# Patient Record
Sex: MALE | Race: WHITE | NOT HISPANIC OR LATINO | Employment: FULL TIME | URBAN - METROPOLITAN AREA
[De-identification: names, ages, dates, MRNs, and addresses within clinical notes are randomized per-mention and may not be internally consistent; named-entity substitution may affect disease eponyms.]

---

## 2018-01-08 ENCOUNTER — EVALUATION (OUTPATIENT)
Dept: PHYSICAL THERAPY | Facility: CLINIC | Age: 78
End: 2018-01-08
Payer: MEDICARE

## 2018-01-08 PROCEDURE — G8991 OTHER PT/OT GOAL STATUS: HCPCS

## 2018-01-08 PROCEDURE — 97110 THERAPEUTIC EXERCISES: CPT

## 2018-01-08 PROCEDURE — 97161 PT EVAL LOW COMPLEX 20 MIN: CPT

## 2018-01-08 PROCEDURE — G8990 OTHER PT/OT CURRENT STATUS: HCPCS

## 2018-01-15 ENCOUNTER — APPOINTMENT (OUTPATIENT)
Dept: PHYSICAL THERAPY | Facility: CLINIC | Age: 78
End: 2018-01-15
Payer: MEDICARE

## 2018-01-15 PROCEDURE — 97110 THERAPEUTIC EXERCISES: CPT

## 2018-01-15 PROCEDURE — 97112 NEUROMUSCULAR REEDUCATION: CPT

## 2018-01-19 ENCOUNTER — APPOINTMENT (OUTPATIENT)
Dept: PHYSICAL THERAPY | Facility: CLINIC | Age: 78
End: 2018-01-19
Payer: MEDICARE

## 2018-01-19 PROCEDURE — 97110 THERAPEUTIC EXERCISES: CPT

## 2018-01-23 ENCOUNTER — APPOINTMENT (OUTPATIENT)
Dept: PHYSICAL THERAPY | Facility: CLINIC | Age: 78
End: 2018-01-23
Payer: MEDICARE

## 2018-01-23 PROCEDURE — 97112 NEUROMUSCULAR REEDUCATION: CPT

## 2018-01-23 PROCEDURE — 97110 THERAPEUTIC EXERCISES: CPT

## 2018-01-25 ENCOUNTER — OFFICE VISIT (OUTPATIENT)
Dept: PHYSICAL THERAPY | Facility: CLINIC | Age: 78
End: 2018-01-25
Payer: MEDICARE

## 2018-01-25 DIAGNOSIS — M79.672 LEFT FOOT PAIN: Primary | ICD-10-CM

## 2018-01-25 PROCEDURE — 97110 THERAPEUTIC EXERCISES: CPT | Performed by: PHYSICAL THERAPIST

## 2018-01-25 PROCEDURE — 97112 NEUROMUSCULAR REEDUCATION: CPT | Performed by: PHYSICAL THERAPIST

## 2018-01-25 NOTE — PROGRESS NOTES
Daily Note     Today's date: 2018  Patient name: Jayne Price  : 1940  MRN: [id number]  Referring provider: Bill Daly MD  Dx:   Encounter Diagnosis   Name Primary?  Left foot pain Yes                  Subjective: no signif changes reported      Objective: See treatment diary below  Precautions: drop foot    Daily Treatment Diary     Manual                                                                                   Exercise Diary              biodex catch 4'            biodex pf/df wt shift 1'x3            SLR: flx, abd, ext 3x10            Clam shell 3x10            bridges 30            ppt 30            Mini squats 30            SLB 5'                                                                                                                                                                            Modalities                                                               Assessment: Tolerated treatment well  Patient could benefit from continued PT      Plan: Continue per plan of care

## 2018-01-26 ENCOUNTER — APPOINTMENT (OUTPATIENT)
Dept: PHYSICAL THERAPY | Facility: CLINIC | Age: 78
End: 2018-01-26
Payer: MEDICARE

## 2018-01-29 ENCOUNTER — OFFICE VISIT (OUTPATIENT)
Dept: PHYSICAL THERAPY | Facility: CLINIC | Age: 78
End: 2018-01-29
Payer: MEDICARE

## 2018-01-29 DIAGNOSIS — M79.672 LEFT FOOT PAIN: Primary | ICD-10-CM

## 2018-01-29 PROCEDURE — 97110 THERAPEUTIC EXERCISES: CPT

## 2018-01-29 NOTE — PROGRESS NOTES
Daily Note     Today's date: 2018  Patient name: Nabeel Agrawal  : 1940  MRN: [id number]  Referring provider: Jodi Pinedo MD  Dx: No diagnosis found  Subjective: Patient reports he felt "fine" after the last visit  No significant changes to report in regards to the left foot  Objective: See treatment diary below  Precautions: drop foot     Daily Treatment Diary   X=performed     Manual                      none                             Exercise Diary                        biodex catch 4'  X                   biodex pf/df wt shift 1'x3  X                   SLR: flx, abd, ext 3x10  X                   Clam shell 3x10  X                   bridges 30  X                   ppt 30  X                   Mini squats 30  15                   SLB 5'  X                    bike    10'                         Modalities                                                    Assessment: Patient complains of right knee pain when performing the mini squats  Plan: Continue per plan of care

## 2018-02-01 ENCOUNTER — OFFICE VISIT (OUTPATIENT)
Dept: PHYSICAL THERAPY | Facility: CLINIC | Age: 78
End: 2018-02-01
Payer: MEDICARE

## 2018-02-01 DIAGNOSIS — M79.672 LEFT FOOT PAIN: Primary | ICD-10-CM

## 2018-02-01 PROCEDURE — 97110 THERAPEUTIC EXERCISES: CPT

## 2018-02-01 NOTE — PROGRESS NOTES
Daily Note     Today's date: 2018  Patient name: Lavell Gallagher  : 1940  MRN: [id number]  Referring provider: Ayala Mcnally MD  Dx: No diagnosis found  Subjective: Patient reports his R knee pain cont  No significant changes to report in regards to the L foot  Objective: See treatment diary below  Precautions: drop foot     Daily Treatment Diary   X=performed     Manual                    none                             Exercise Diary                      biodex catch 4'  X  X                 biodex pf/df wt shift 1'x3  X  X                 SLR: flx, abd, ext 3x10  X  X                 Clam shell 3x10  X  X                 bridges 30  X  X                 ppt 30  X  X                 Mini squats 30  15  30                 SLB 5'  X  X                 Treadmill   1  7mph x 10'           bike    10'  NP                       Modalities                                                    Assessment: Pt requested w/u on TM in leiu of bike  R knee pain when performing the mini squats  Side lying hip ABD challenging on L  Plan: Continue per plan of care

## 2018-02-06 ENCOUNTER — OFFICE VISIT (OUTPATIENT)
Dept: PHYSICAL THERAPY | Facility: CLINIC | Age: 78
End: 2018-02-06
Payer: MEDICARE

## 2018-02-06 DIAGNOSIS — M79.672 LEFT FOOT PAIN: Primary | ICD-10-CM

## 2018-02-06 PROCEDURE — 97110 THERAPEUTIC EXERCISES: CPT | Performed by: PHYSICAL THERAPIST

## 2018-02-06 PROCEDURE — 97112 NEUROMUSCULAR REEDUCATION: CPT | Performed by: PHYSICAL THERAPIST

## 2018-02-06 NOTE — PROGRESS NOTES
Daily Note     Today's date: 2018  Patient name: Sendy Wade  : 1940  MRN: [id number]  Referring provider: Lizeth Ac MD  Dx:   Encounter Diagnosis   Name Primary?  Left foot pain Yes                  Subjective: no signif changes reported  Objective: See treatment diary below  Precautions: drop foot     Daily Treatment Diary   X=performed     Manual                  none                             Exercise Diary                    biodex catch 4'  X  X  x               biodex pf/df wt shift 1'x3  X  X  x               SLR: flx, abd, ext 3x10  X  X  x               Clam shell 3x10  X  X  x               bridges 30  X  X  x               ppt 30  X  X  x               Mini squats 30  15  30  np               SLB 5'  X  X  x               Treadmill   1  7mph x 10' x          bike    10'  NP  np                     Modalities                                                    Assessment: Samantha TM well with HHA using railing  Bike and squats np due to knee pain  Plan: Continue per plan of care

## 2018-02-08 ENCOUNTER — OFFICE VISIT (OUTPATIENT)
Dept: PHYSICAL THERAPY | Facility: CLINIC | Age: 78
End: 2018-02-08
Payer: MEDICARE

## 2018-02-08 DIAGNOSIS — M79.672 LEFT FOOT PAIN: Primary | ICD-10-CM

## 2018-02-08 PROCEDURE — 97112 NEUROMUSCULAR REEDUCATION: CPT | Performed by: PHYSICAL THERAPY ASSISTANT

## 2018-02-08 PROCEDURE — 97110 THERAPEUTIC EXERCISES: CPT | Performed by: PHYSICAL THERAPY ASSISTANT

## 2018-02-08 NOTE — PROGRESS NOTES
Daily Note     Today's date: 2018  Patient name: Maliha Magana  : 1940  MRN: [id number]  Referring provider: Addis Ordonez MD  Dx:   Encounter Diagnosis   Name Primary?  Left foot pain Yes                  Subjective: no signif changes reported  Objective: See treatment diary below  Precautions: drop foot     Daily Treatment Diary   X=performed     Manual                none                             Exercise Diary                  biodex catch 4'  X  X  x  4'             biodex pf/df wt shift 1'x3  X  X  x  1'x3             SLR: flx, abd, ext 3x10  X  X  x  3x10             Clam shell 3x10  X  X  x  3x10             bridges 30  X  X  x  3x10             ppt 30  X  X  x  3x10             Mini squats 30  15  30  np  NP             SLB 5'  X  X  x  5'             Treadmill   1  7mph x 10' x 2 0mph  x10'         bike    10'  NP  np  NP                   Modalities                                                    Assessment: Samantha TM well with HHA using railing  Bike and squats np due to knee pain  Good tolerance to TE as noted  Pt demonstrates good compliance and understanding of exercise program      Plan: Continue per plan of care

## 2018-02-13 ENCOUNTER — APPOINTMENT (OUTPATIENT)
Dept: PHYSICAL THERAPY | Facility: CLINIC | Age: 78
End: 2018-02-13
Payer: MEDICARE

## 2018-02-15 ENCOUNTER — OFFICE VISIT (OUTPATIENT)
Dept: PHYSICAL THERAPY | Facility: CLINIC | Age: 78
End: 2018-02-15
Payer: MEDICARE

## 2018-02-15 DIAGNOSIS — M79.672 LEFT FOOT PAIN: Primary | ICD-10-CM

## 2018-02-15 PROCEDURE — 97112 NEUROMUSCULAR REEDUCATION: CPT

## 2018-02-15 PROCEDURE — 97110 THERAPEUTIC EXERCISES: CPT

## 2018-02-15 NOTE — PROGRESS NOTES
Daily Note     Today's date: 2/15/2018  Patient name: Grant Fuentes  : 1940  MRN: [id number]  Referring provider: Justine Lamas MD  Dx:   No diagnosis found  Subjective: No significant changes to report since the last visit  Objective: See treatment diary below  Precautions: drop foot     Daily Treatment Diary   X=performed     Manual  1/25  1/29  2/1  2/6  2/8  2/15            none                             Exercise Diary     1/29  2/1  2/6  2/8  2/15           biodex catch 4'  X  X  x  4'  L 11   4'           biodex pf/df wt shift 1'x3  X  X  x  1'x3  L 10  90"x2           SLR: flx, abd, ext 3x10  X  X  x  3x10  x           Clam shell 3x10  X  X  x  3x10  x           bridges 30  X  X  x  3x10  x           ppt 30  X  X  x  3x10  x           Mini squats 30  15  30  np  NP  NP           SLB 5'  X  X  x  5'  30"x3           Treadmill   1  7mph x 10' x 2 0mph  x10' x        bike    10'  NP  np  NP  NP           Tandem ambulation on foam balance beams      8 laps             Modalities                         none                         Assessment: Patient's balance is challenged with additional tandem ambulation this visit  Plan: Continue per plan of care

## 2018-02-20 ENCOUNTER — OFFICE VISIT (OUTPATIENT)
Dept: PHYSICAL THERAPY | Facility: CLINIC | Age: 78
End: 2018-02-20
Payer: MEDICARE

## 2018-02-20 DIAGNOSIS — M79.672 LEFT FOOT PAIN: Primary | ICD-10-CM

## 2018-02-20 PROCEDURE — 97110 THERAPEUTIC EXERCISES: CPT | Performed by: PHYSICAL THERAPIST

## 2018-02-20 PROCEDURE — G8990 OTHER PT/OT CURRENT STATUS: HCPCS | Performed by: PHYSICAL THERAPIST

## 2018-02-20 PROCEDURE — G8991 OTHER PT/OT GOAL STATUS: HCPCS | Performed by: PHYSICAL THERAPIST

## 2018-02-20 PROCEDURE — 97112 NEUROMUSCULAR REEDUCATION: CPT | Performed by: PHYSICAL THERAPIST

## 2018-02-20 NOTE — PROGRESS NOTES
Daily Note     Today's date: 2018  Patient name: Carlos Louis  : 1940  MRN: [id number]  Referring provider: All Ahn MD  Dx:   Encounter Diagnosis   Name Primary?  Left foot pain Yes                  Subjective: No significant changes to report since the last visit  Objective: See treatment diary below  Precautions: drop foot     Daily Treatment Diary   X=performed     Manual  1/25  1/29  2/1  2/6  2/8  2/15  2/20          none                             Exercise Diary     1/29  2/1  2/6  2/8  2/15  2/20         biodex catch 4'  X  X  x  4'  L 11   4'  4'         biodex pf/df wt shift 1'x3  X  X  x  1'x3  L 10  90"x2  np         SLR: flx, abd, ext 3x10  X  X  x  3x10  x  x         Clam shell 3x10  X  X  x  3x10  x  x         bridges 30  X  X  x  3x10  x  x         ppt 30  X  X  x  3x10  x  x         Mini squats 30  15  30  np  NP  NP           SLB 5'  X  X  x  5'  30"x3  x         Treadmill   1  7mph x 10' x 2 0mph  x10' x x       bike    10'  NP  np  NP  NP  np         Tandem ambulation on foam balance beams      8 laps np            Modalities                         none                         Assessment: pt edilson rx well    Plan: anticipate DC to [facility] next week

## 2018-02-23 ENCOUNTER — OFFICE VISIT (OUTPATIENT)
Dept: PHYSICAL THERAPY | Facility: CLINIC | Age: 78
End: 2018-02-23
Payer: MEDICARE

## 2018-02-23 DIAGNOSIS — M79.672 LEFT FOOT PAIN: Primary | ICD-10-CM

## 2018-02-23 PROCEDURE — 97014 ELECTRIC STIMULATION THERAPY: CPT | Performed by: PHYSICAL THERAPIST

## 2018-02-23 PROCEDURE — 97112 NEUROMUSCULAR REEDUCATION: CPT | Performed by: PHYSICAL THERAPIST

## 2018-02-23 PROCEDURE — 97110 THERAPEUTIC EXERCISES: CPT | Performed by: PHYSICAL THERAPIST

## 2018-02-23 NOTE — PROGRESS NOTES
Daily Note     Today's date: 2018  Patient name: Miguelito Osborne  : 1940  MRN: [id number]  Referring provider: Rosalva Turner MD  Dx:   Encounter Diagnosis   Name Primary?  Left foot pain Yes                  Subjective: No significant changes to report since the last visit  Objective: See treatment diary below  Precautions: drop foot     Daily Treatment Diary   X=performed     Manual  1/25  1/29  2/1  2/6  2/8  2/15  2/20  2/23        none                             Exercise Diary     1/29  2/1  2/6  2/8  2/15  2/20         biodex catch 4'  X  X  x  4'  L 11   4'  4'  4'       biodex pf/df wt shift 1'x3  X  X  x  1'x3  L 10  90"x2  np  np       SLR: flx, abd, ext 3x10  X  X  x  3x10  x  x  x       Clam shell 3x10  X  X  x  3x10  x  x  x       bridges 30  X  X  x  3x10  x  x  x       ppt 30  X  X  x  3x10  x  x  x       Mini squats 30  15  30  np  NP  NP           SLB 5'  X  X  x  5'  30"x3  x         Treadmill   1  7mph x 10' x 2 0mph  x10' x x x      bike    10'  NP  np  NP  NP  np  np       Tandem ambulation on foam balance beams      8 laps np np           Modalities                         none                       Trial of Russian stim to activate ant tib, no muscle activity noted  Assessment: pt edilson rx well    Plan: anticipate DC to [facility] next week

## 2018-02-28 ENCOUNTER — OFFICE VISIT (OUTPATIENT)
Dept: PHYSICAL THERAPY | Facility: CLINIC | Age: 78
End: 2018-02-28
Payer: MEDICARE

## 2018-02-28 DIAGNOSIS — M79.672 LEFT FOOT PAIN: Primary | ICD-10-CM

## 2018-02-28 PROCEDURE — 97110 THERAPEUTIC EXERCISES: CPT | Performed by: PHYSICAL THERAPIST

## 2018-02-28 NOTE — PROGRESS NOTES
Daily Note     Today's date: 2018  Patient name: Cyndi Arce  : 1940  MRN: [id number]  Referring provider: Samantha Chin MD  Dx:   Encounter Diagnosis   Name Primary?  Left foot pain Yes                  Subjective: No significant changes to report since the last visit  Objective: See treatment diary below  Precautions: drop foot     Daily Treatment Diary   X=performed     Exercise Diary     1/29  2/1  2/6  2/8  2/15  2/20    2/27     biodex catch 4'  X  X  x  4'  L 11   4'  4'  4'  '6     biodex pf/df wt shift 1'x3  X  X  x  1'x3  L 10  90"x2  np  np       SLR: flx, abd, ext 3x10  X  X  x  3x10  x  x  x  x     Clam shell 3x10  X  X  x  3x10  x  x  x  x     bridges 30  X  X  x  3x10  x  x  x  x     ppt 30  X  X  x  3x10  x  x  x  x     Mini squats 30  15  30  np  NP  NP           SLB 5'  X  X  x  5'  30"x3  x    x     Treadmill     1  7mph x 10' x 2 0mph  x10' x x x  np      bike    10'  NP  np  NP  NP  np  np       Tandem ambulation on foam balance beams            x               Assessment: pt edilson rx well    Plan: anticipate DC to [facility] next week

## 2018-10-24 ENCOUNTER — TRANSCRIBE ORDERS (OUTPATIENT)
Dept: NEUROSURGERY | Facility: CLINIC | Age: 78
End: 2018-10-24

## 2018-10-24 DIAGNOSIS — M54.50 LOWER BACK PAIN: Primary | ICD-10-CM

## 2018-10-26 ENCOUNTER — TRANSCRIBE ORDERS (OUTPATIENT)
Dept: ADMINISTRATIVE | Facility: HOSPITAL | Age: 78
End: 2018-10-26

## 2018-10-26 ENCOUNTER — APPOINTMENT (OUTPATIENT)
Dept: LAB | Facility: CLINIC | Age: 78
End: 2018-10-26
Payer: MEDICARE

## 2018-10-26 DIAGNOSIS — E78.00 PURE HYPERCHOLESTEROLEMIA: ICD-10-CM

## 2018-10-26 DIAGNOSIS — D51.0 PERNICIOUS ANEMIA: ICD-10-CM

## 2018-10-26 DIAGNOSIS — E55.9 AVITAMINOSIS D: ICD-10-CM

## 2018-10-26 DIAGNOSIS — I51.9 MYXEDEMA HEART DISEASE: ICD-10-CM

## 2018-10-26 DIAGNOSIS — E13.9 DIABETES MELLITUS OF OTHER TYPE WITHOUT COMPLICATION, UNSPECIFIED WHETHER LONG TERM INSULIN USE (HCC): ICD-10-CM

## 2018-10-26 DIAGNOSIS — E13.9 DIABETES MELLITUS OF OTHER TYPE WITHOUT COMPLICATION, UNSPECIFIED WHETHER LONG TERM INSULIN USE (HCC): Primary | ICD-10-CM

## 2018-10-26 DIAGNOSIS — E03.9 MYXEDEMA HEART DISEASE: ICD-10-CM

## 2018-10-26 DIAGNOSIS — D64.9 ANEMIA, UNSPECIFIED TYPE: ICD-10-CM

## 2018-10-26 DIAGNOSIS — R53.83 FATIGUE, UNSPECIFIED TYPE: ICD-10-CM

## 2018-10-26 DIAGNOSIS — N40.0 BENIGN PROSTATIC HYPERPLASIA, UNSPECIFIED WHETHER LOWER URINARY TRACT SYMPTOMS PRESENT: ICD-10-CM

## 2018-10-26 LAB
25(OH)D3 SERPL-MCNC: 17.6 NG/ML (ref 30–100)
ALBUMIN SERPL BCP-MCNC: 3.9 G/DL (ref 3.5–5)
ALP SERPL-CCNC: 88 U/L (ref 46–116)
ALT SERPL W P-5'-P-CCNC: 44 U/L (ref 12–78)
ANION GAP SERPL CALCULATED.3IONS-SCNC: 5 MMOL/L (ref 4–13)
AST SERPL W P-5'-P-CCNC: 29 U/L (ref 5–45)
BASOPHILS # BLD AUTO: 0.03 THOUSANDS/ΜL (ref 0–0.1)
BASOPHILS NFR BLD AUTO: 0 % (ref 0–1)
BILIRUB SERPL-MCNC: 0.95 MG/DL (ref 0.2–1)
BUN SERPL-MCNC: 21 MG/DL (ref 5–25)
CALCIUM SERPL-MCNC: 8.9 MG/DL (ref 8.3–10.1)
CHLORIDE SERPL-SCNC: 104 MMOL/L (ref 100–108)
CHOLEST SERPL-MCNC: 158 MG/DL (ref 50–200)
CO2 SERPL-SCNC: 29 MMOL/L (ref 21–32)
CREAT SERPL-MCNC: 0.72 MG/DL (ref 0.6–1.3)
CREAT UR-MCNC: 153 MG/DL
EOSINOPHIL # BLD AUTO: 0.35 THOUSAND/ΜL (ref 0–0.61)
EOSINOPHIL NFR BLD AUTO: 5 % (ref 0–6)
ERYTHROCYTE [DISTWIDTH] IN BLOOD BY AUTOMATED COUNT: 12.8 % (ref 11.6–15.1)
GFR SERPL CREATININE-BSD FRML MDRD: 89 ML/MIN/1.73SQ M
GLUCOSE P FAST SERPL-MCNC: 115 MG/DL (ref 65–99)
HCT VFR BLD AUTO: 46.8 % (ref 36.5–49.3)
HDLC SERPL-MCNC: 47 MG/DL (ref 40–60)
HGB BLD-MCNC: 16.1 G/DL (ref 12–17)
IMM GRANULOCYTES # BLD AUTO: 0.01 THOUSAND/UL (ref 0–0.2)
IMM GRANULOCYTES NFR BLD AUTO: 0 % (ref 0–2)
LDLC SERPL CALC-MCNC: 93 MG/DL (ref 0–100)
LYMPHOCYTES # BLD AUTO: 2.06 THOUSANDS/ΜL (ref 0.6–4.47)
LYMPHOCYTES NFR BLD AUTO: 29 % (ref 14–44)
MCH RBC QN AUTO: 33.2 PG (ref 26.8–34.3)
MCHC RBC AUTO-ENTMCNC: 34.4 G/DL (ref 31.4–37.4)
MCV RBC AUTO: 97 FL (ref 82–98)
MICROALBUMIN UR-MCNC: 47.9 MG/L (ref 0–20)
MICROALBUMIN/CREAT 24H UR: 31 MG/G CREATININE (ref 0–30)
MONOCYTES # BLD AUTO: 0.5 THOUSAND/ΜL (ref 0.17–1.22)
MONOCYTES NFR BLD AUTO: 7 % (ref 4–12)
NEUTROPHILS # BLD AUTO: 4.27 THOUSANDS/ΜL (ref 1.85–7.62)
NEUTS SEG NFR BLD AUTO: 59 % (ref 43–75)
NONHDLC SERPL-MCNC: 111 MG/DL
NRBC BLD AUTO-RTO: 0 /100 WBCS
PLATELET # BLD AUTO: 224 THOUSANDS/UL (ref 149–390)
PMV BLD AUTO: 10.4 FL (ref 8.9–12.7)
POTASSIUM SERPL-SCNC: 3.8 MMOL/L (ref 3.5–5.3)
PROT SERPL-MCNC: 7.4 G/DL (ref 6.4–8.2)
PSA SERPL-MCNC: 0.5 NG/ML (ref 0–4)
RBC # BLD AUTO: 4.85 MILLION/UL (ref 3.88–5.62)
SODIUM SERPL-SCNC: 138 MMOL/L (ref 136–145)
TRIGL SERPL-MCNC: 88 MG/DL
TSH SERPL DL<=0.05 MIU/L-ACNC: 0.98 UIU/ML (ref 0.36–3.74)
VIT B12 SERPL-MCNC: 583 PG/ML (ref 100–900)
WBC # BLD AUTO: 7.22 THOUSAND/UL (ref 4.31–10.16)

## 2018-10-26 PROCEDURE — 85025 COMPLETE CBC W/AUTO DIFF WBC: CPT

## 2018-10-26 PROCEDURE — 84443 ASSAY THYROID STIM HORMONE: CPT

## 2018-10-26 PROCEDURE — 82043 UR ALBUMIN QUANTITATIVE: CPT | Performed by: FAMILY MEDICINE

## 2018-10-26 PROCEDURE — 80061 LIPID PANEL: CPT

## 2018-10-26 PROCEDURE — 36415 COLL VENOUS BLD VENIPUNCTURE: CPT

## 2018-10-26 PROCEDURE — G0103 PSA SCREENING: HCPCS

## 2018-10-26 PROCEDURE — 82306 VITAMIN D 25 HYDROXY: CPT

## 2018-10-26 PROCEDURE — 80053 COMPREHEN METABOLIC PANEL: CPT

## 2018-10-26 PROCEDURE — 82570 ASSAY OF URINE CREATININE: CPT | Performed by: FAMILY MEDICINE

## 2018-10-26 PROCEDURE — 82607 VITAMIN B-12: CPT

## 2018-10-29 ENCOUNTER — HOSPITAL ENCOUNTER (OUTPATIENT)
Dept: RADIOLOGY | Facility: HOSPITAL | Age: 78
Discharge: HOME | End: 2018-10-29
Attending: NEUROLOGICAL SURGERY
Payer: MEDICARE

## 2018-10-29 ENCOUNTER — OFFICE VISIT (OUTPATIENT)
Dept: NEUROSURGERY | Facility: CLINIC | Age: 78
End: 2018-10-29
Payer: MEDICARE

## 2018-10-29 VITALS
SYSTOLIC BLOOD PRESSURE: 146 MMHG | RESPIRATION RATE: 18 BRPM | TEMPERATURE: 97.9 F | WEIGHT: 204 LBS | DIASTOLIC BLOOD PRESSURE: 70 MMHG | HEIGHT: 69 IN | HEART RATE: 92 BPM | BODY MASS INDEX: 30.21 KG/M2

## 2018-10-29 DIAGNOSIS — M48.062 LUMBAR STENOSIS WITH NEUROGENIC CLAUDICATION: ICD-10-CM

## 2018-10-29 DIAGNOSIS — M54.50 LOWER BACK PAIN: ICD-10-CM

## 2018-10-29 DIAGNOSIS — M21.372 FOOT DROP, BILATERAL: ICD-10-CM

## 2018-10-29 DIAGNOSIS — M43.16 SPONDYLOLISTHESIS OF LUMBAR REGION: ICD-10-CM

## 2018-10-29 DIAGNOSIS — M21.371 FOOT DROP, BILATERAL: ICD-10-CM

## 2018-10-29 DIAGNOSIS — M43.16 SPONDYLOLISTHESIS OF LUMBAR REGION: Primary | ICD-10-CM

## 2018-10-29 PROCEDURE — 72120 X-RAY BEND ONLY L-S SPINE: CPT

## 2018-10-29 PROCEDURE — 99205 OFFICE O/P NEW HI 60 MIN: CPT | Performed by: NEUROLOGICAL SURGERY

## 2018-10-29 RX ORDER — DIPHENOXYLATE HYDROCHLORIDE AND ATROPINE SULFATE 2.5; .025 MG/1; MG/1
1 TABLET ORAL DAILY
COMMUNITY
End: 2018-12-12 | Stop reason: HOSPADM

## 2018-10-29 RX ORDER — ACETAMINOPHEN 500 MG
1000 TABLET ORAL EVERY 6 HOURS PRN
COMMUNITY
Start: 2017-12-19

## 2018-10-29 RX ORDER — CYANOCOBALAMIN (VITAMIN B-12) 5000 MCG
TABLET,DISINTEGRATING ORAL DAILY
COMMUNITY
Start: 2017-12-19

## 2018-10-29 NOTE — LETTER
October 29, 2018     Jimmyt[place] Kayla, [address]     Patient: Don Burroughs   YOB: 1940   Date of Visit: 10/29/2018       Dear Dr Lorenzo Tate: Thank you for referring Russellhollie Jarrell to me for evaluation  Below are my notes for this consultation  If you have questions, please do not hesitate to call me  I look forward to following your patient along with you  Sincerely,        Jessica Michelle MD        CC: No Recipients  Jessica Michelle MD  10/29/2018  3:27 PM  Sign at close encounter  Office Note - Neurosurgery   oDn Burroughs 77 y o  male MRN: [id number]      Assessment:    Patient is gradually worsening  Symptoms, as detailed in HPI, continue to significantly impact of patient's quality of life in daily activities  After carefully considering presentation, investigations, functional status and co-morbidities, the risk/benefit profile of surgical intervention is favorable  50-year-old gentleman with progressive lumbar neurogenic claudication secondary to L4-5 stenosis with spondylolisthesis  His symptoms continue to progress  I explained that the natural history is for progressive decline in gait  Over time this can result in functional paraplegia and incontinence  He does not have much pain and I do not think epidural steroid injection or pain medication will significantly improve his symptoms  We discussed L4-5 posterior decompression with instrumented fixation fusion and possible transverse lumbar interbody fusion with possible extension to additional levels  He has significant bilateral footdrop which will not improve given the severity and duration of the symptoms  I asked him to discuss fitting for an AFO bilaterally with his PCP to improve his gait stability and lower his risk of falls  The goal of surgery is to relieve pressure neural structures and hopefully improve radicular pain and symptoms of neurogenic claudication  Weakness, numbness and back pain are less likely to improve  The risks of surgery were described in detail  1  Risk of general anesthetic with possible cardiac and respiratory complication  Risk of infection and bleeding  2  Risk of neurological injury with new pain, weakness or numbness in the legs or difficulties with bowel and bladder function  Risk of CSF leak was described  3  Possible need for additional lumbar spine surgery in the future  Expected postoperative course, including activity restrictions, expected pain and postoperative medication were reviewed  Patient provided verbal consent to surgical procedure and signed consent form: Yes  He will require medical clearance  He will return to review results of an up-to-date MRI and plain films lumbar spine prior to surgery  He will contact my office should any concerns arise in the interim  History, physical examination and diagnostic tests were reviewed and questions answered  Diagnosis, care plan and treatment options were discussed  The patient and spouse/SO understand instructions and will follow up as directed  Plan:    Follow-up:   After tests complete      Problem List Items Addressed This Visit        Musculoskeletal and Integument    Spondylolisthesis of lumbar region - Primary    Relevant Orders    X-ray lumbar spine flexion and extension only 4+ views    MRI lumbar spine wo contrast    Case request operating room: L4-5 posterior decompression with instrumented fixation and fusion and possible transverse lumbar interbody graft with possible extension to additional levels (Completed)    Ambulatory referral to Family Practice       Other    Lumbar stenosis with neurogenic claudication    Relevant Orders    X-ray lumbar spine flexion and extension only 4+ views    MRI lumbar spine wo contrast    Case request operating room: L4-5 posterior decompression with instrumented fixation and fusion and possible transverse lumbar interbody graft with possible extension to additional levels (Completed)    Ambulatory referral to [facility]    Foot drop, bilateral    Relevant Orders    Case request operating room: L4-5 posterior decompression with instrumented fixation and fusion and possible transverse lumbar interbody graft with possible extension to additional levels (Completed)    Ambulatory referral to [facility]      Other Visit Diagnoses     Lower back pain              Subjective/Objective     Chief Complaint      Progressive gait difficulties  HPI      34-year-old gentleman with a 2 year history of progressive decline in gait and walking  He developed a left foot drop 2 years ago which was 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  More recently in the last half year so he has began to notice week miss in the right foot as well  He denies any significant lower back pain which she rates as 2/10  He denies any pain in his legs  When he stands and walks he feels clumsiness in his legs and numbness in his feet which seems to progress  He can ambulate for approximately 2 blocks before he must sit down or lean forward and his symptoms improve and he can walk again  He denies any difficulties with bowel bladder function or changing perineal sensation  He has noticed that he tripped over steps  Physical therapy was initially helpful  He occasionally takes Tylenol  He has not had epidural steroid injection  He presents today with an out of date MRI from 2017  ROS    Review of Systems   Constitutional: Negative for fatigue  HENT: Negative  Eyes: Negative  Respiratory: Negative  Cardiovascular: Negative  Gastrointestinal: Positive for constipation  Endocrine: Negative  Genitourinary: Positive for decreased urine volume  Negative for frequency and urgency  Musculoskeletal: Positive for gait problem (off balance, recent falls due to drop foot )   Negative for back pain (across lower back, more of achey pain then sharp pain )  Skin: Negative  Allergic/Immunologic: Negative  Neurological: Positive for weakness (bilateral legs ) and numbness (bottom of bilateral feet numbness, and left foot tingling)  Negative for dizziness, seizures, syncope and headaches  Hematological: Negative  Psychiatric/Behavioral: Positive for sleep disturbance (due to knee discomfort and pain )  Negative for confusion  Family History    History reviewed  No pertinent family history  Social History    Social History     Social History    Marital status: Single     Spouse name: N/A    Number of children: N/A    Years of education: N/A     Occupational History    Not on file       Social History Main Topics    Smoking status: Former Smoker    Smokeless tobacco: Never Used    Alcohol use Yes    Drug use: No    Sexual activity: Not on file     Other Topics Concern    Not on file     Social History Narrative    No narrative on file       Past Medical History    Past Medical History:   Diagnosis Date    History of chicken pox     Kidney stones     Measles        Surgical History    Past Surgical History:   Procedure Laterality Date    KIDNEY STONE SURGERY         Medications      Current Outpatient Prescriptions:     acetaminophen (TYLENOL) 500 mg tablet, Take 1,000 mg by mouth every 6 (six) hours as needed, Disp: , Rfl:     Cyanocobalamin (VITAMIN B-12) 5000 MCG TBDP, Take by mouth daily, Disp: , Rfl:     multivitamin (THERAGRAN) TABS, Take 1 tablet by mouth daily, Disp: , Rfl:     Allergies    Allergies   Allergen Reactions    Iodine Rash       The following portions of the patient's history were reviewed and updated as appropriate: allergies, current medications, past family history, past medical history, past social history, past surgical history and problem list     Investigations    I personally reviewed the MRI results with the patient:     MRI of the thoracic spine without contrast dated August 29th, 2017  Grade 1-2 degenerative anterolisthesis at L4-5 producing moderate central and severe bilateral relieve lateral recess and foraminal stenosis  Milder degenerative changes throughout the lumbar spine which are not clearly compressive  Intrathecal contents unremarkable  Physical Exam    Vitals:  Blood pressure 146/70, pulse 92, temperature 97 9 °F (36 6 °C), resp  rate 18, height 5' 9" (1 753 m), weight 92 5 kg (204 lb)  ,Body mass index is 30 13 kg/m²  Physical Exam   Constitutional: He is oriented to person, place, and time  He appears well-developed and well-nourished  No distress  Cardiovascular: Normal rate, regular rhythm and normal heart sounds  Pulmonary/Chest: Effort normal and breath sounds normal    Musculoskeletal: Normal range of motion  Neurological: He is alert and oriented to person, place, and time  He has normal strength  Reflex Scores:       Patellar reflexes are 0 on the right side and 0 on the left side  Achilles reflexes are 0 on the right side  Skin: Skin is warm and dry  Psychiatric: He has a normal mood and affect  His behavior is normal    Vitals reviewed  Neurologic Exam     Mental Status   Oriented to person, place, and time  Motor Exam     Strength   Strength 5/5 throughout    0/5 power on dorsiflexion  And EHL bilaterally  Sensory Exam   Right leg light touch: normal  Left leg light touch: normal   Diminished pinprick sensation on medial malleoli bilaterally  Gait, Coordination, and Reflexes     Reflexes   Right patellar: 0  Left patellar: 0  Right achilles: 0  Left : 0  Right ankle clonus: absent  Left ankle clonus: absent  Walks with a mildly wide-based steppage gait to accommodate for bilateral footdrop

## 2018-10-29 NOTE — PROGRESS NOTES
Office Note - Neurosurgery   Yung Gamez 77 y o  male MRN: [id number]      Assessment:    Patient is gradually worsening  Symptoms, as detailed in HPI, continue to significantly impact of patient's quality of life in daily activities  After carefully considering presentation, investigations, functional status and co-morbidities, the risk/benefit profile of surgical intervention is favorable  34-year-old gentleman with progressive lumbar neurogenic claudication secondary to L4-5 stenosis with spondylolisthesis  His symptoms continue to progress  I explained that the natural history is for progressive decline in gait  Over time this can result in functional paraplegia and incontinence  He does not have much pain and I do not think epidural steroid injection or pain medication will significantly improve his symptoms  We discussed L4-5 posterior decompression with instrumented fixation fusion and possible transverse lumbar interbody fusion with possible extension to additional levels  He has significant bilateral footdrop which will not improve given the severity and duration of the symptoms  I asked him to discuss fitting for an AFO bilaterally with his PCP to improve his gait stability and lower his risk of falls  The goal of surgery is to relieve pressure neural structures and hopefully improve radicular pain and symptoms of neurogenic claudication  Weakness, numbness and back pain are less likely to improve  The risks of surgery were described in detail  1  Risk of general anesthetic with possible cardiac and respiratory complication  Risk of infection and bleeding  2  Risk of neurological injury with new pain, weakness or numbness in the legs or difficulties with bowel and bladder function  Risk of CSF leak was described  3  Possible need for additional lumbar spine surgery in the future             Expected postoperative course, including activity restrictions, expected pain and postoperative medication were reviewed  Patient provided verbal consent to surgical procedure and signed consent form: Yes  He will require medical clearance  He will return to review results of an up-to-date MRI and plain films lumbar spine prior to surgery  He will contact my office should any concerns arise in the interim  History, physical examination and diagnostic tests were reviewed and questions answered  Diagnosis, care plan and treatment options were discussed  The patient and spouse/SO understand instructions and will follow up as directed  Plan:    Follow-up:   After tests complete  Problem List Items Addressed This Visit        Musculoskeletal and Integument    Spondylolisthesis of lumbar region - Primary    Relevant Orders    X-ray lumbar spine flexion and extension only 4+ views    MRI lumbar spine wo contrast    Case request operating room: L4-5 posterior decompression with instrumented fixation and fusion and possible transverse lumbar interbody graft with possible extension to additional levels (Completed)    Ambulatory referral to Family Practice       Other    Lumbar stenosis with neurogenic claudication    Relevant Orders    X-ray lumbar spine flexion and extension only 4+ views    MRI lumbar spine wo contrast    Case request operating room: L4-5 posterior decompression with instrumented fixation and fusion and possible transverse lumbar interbody graft with possible extension to additional levels (Completed)    Ambulatory referral to [facility], bilateral    Relevant Orders    Case request operating room: L4-5 posterior decompression with instrumented fixation and fusion and possible transverse lumbar interbody graft with possible extension to additional levels (Completed)    Ambulatory referral to [facility]      Other Visit Diagnoses     Lower back pain              Subjective/Objective     Chief Complaint      Progressive gait difficulties      HPI 19-year-old gentleman with a 2 year history of progressive decline in gait and walking  He developed a left foot drop 2 years ago which was 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  More recently in the last half year so he has began to notice week miss in the right foot as well  He denies any significant lower back pain which she rates as 2/10  He denies any pain in his legs  When he stands and walks he feels clumsiness in his legs and numbness in his feet which seems to progress  He can ambulate for approximately 2 blocks before he must sit down or lean forward and his symptoms improve and he can walk again  He denies any difficulties with bowel bladder function or changing perineal sensation  He has noticed that he tripped over steps  Physical therapy was initially helpful  He occasionally takes Tylenol  He has not had epidural steroid injection  He presents today with an out of date MRI from 2017  ROS    Review of Systems   Constitutional: Negative for fatigue  HENT: Negative  Eyes: Negative  Respiratory: Negative  Cardiovascular: Negative  Gastrointestinal: Positive for constipation  Endocrine: Negative  Genitourinary: Positive for decreased urine volume  Negative for frequency and urgency  Musculoskeletal: Positive for gait problem (off balance, recent falls due to drop foot )  Negative for back pain (across lower back, more of achey pain then sharp pain )  Skin: Negative  Allergic/Immunologic: Negative  Neurological: Positive for weakness (bilateral legs ) and numbness (bottom of bilateral feet numbness, and left foot tingling)  Negative for dizziness, seizures, syncope and headaches  Hematological: Negative  Psychiatric/Behavioral: Positive for sleep disturbance (due to knee discomfort and pain )  Negative for confusion  Family History    History reviewed  No pertinent family history      Social History    Social History     Social History  Marital status: Single     Spouse name: N/A    Number of children: N/A    Years of education: N/A     Occupational History    Not on file  Social History Main Topics    Smoking status: Former Smoker    Smokeless tobacco: Never Used    Alcohol use Yes    Drug use: No    Sexual activity: Not on file     Other Topics Concern    Not on file     Social History Narrative    No narrative on file       Past Medical History    Past Medical History:   Diagnosis Date    History of chicken pox     Kidney stones     Measles        Surgical History    Past Surgical History:   Procedure Laterality Date    KIDNEY STONE SURGERY         Medications      Current Outpatient Prescriptions:     acetaminophen (TYLENOL) 500 mg tablet, Take 1,000 mg by mouth every 6 (six) hours as needed, Disp: , Rfl:     Cyanocobalamin (VITAMIN B-12) 5000 MCG TBDP, Take by mouth daily, Disp: , Rfl:     multivitamin (THERAGRAN) TABS, Take 1 tablet by mouth daily, Disp: , Rfl:     Allergies    Allergies   Allergen Reactions    Iodine Rash       The following portions of the patient's history were reviewed and updated as appropriate: allergies, current medications, past family history, past medical history, past social history, past surgical history and problem list     Investigations    I personally reviewed the MRI results with the patient:     MRI of the thoracic spine without contrast dated August 29th, 2017  Grade 1-2 degenerative anterolisthesis at L4-5 producing moderate central and severe bilateral relieve lateral recess and foraminal stenosis  Milder degenerative changes throughout the lumbar spine which are not clearly compressive  Intrathecal contents unremarkable  Physical Exam    Vitals:  Blood pressure 146/70, pulse 92, temperature 97 9 °F (36 6 °C), resp  rate 18, height 5' 9" (1 753 m), weight 92 5 kg (204 lb)  ,Body mass index is 30 13 kg/m²      Physical Exam   Constitutional: He is oriented to person, place, and time  He appears well-developed and well-nourished  No distress  Cardiovascular: Normal rate, regular rhythm and normal heart sounds  Pulmonary/Chest: Effort normal and breath sounds normal    Musculoskeletal: Normal range of motion  Neurological: He is alert and oriented to person, place, and time  He has normal strength  Reflex Scores:       Patellar reflexes are 0 on the right side and 0 on the left side  Achilles reflexes are 0 on the right side  Skin: Skin is warm and dry  Psychiatric: He has a normal mood and affect  His behavior is normal    Vitals reviewed  Neurologic Exam     Mental Status   Oriented to person, place, and time  Motor Exam     Strength   Strength 5/5 throughout    0/5 power on dorsiflexion  And EHL bilaterally  Sensory Exam   Right leg light touch: normal  Left leg light touch: normal   Diminished pinprick sensation on medial malleoli bilaterally  Gait, Coordination, and Reflexes     Reflexes   Right patellar: 0  Left patellar: 0  Right achilles: 0  Left : 0  Right ankle clonus: absent  Left ankle clonus: absent  Walks with a mildly wide-based steppage gait to accommodate for bilateral footdrop

## 2018-11-05 ENCOUNTER — DOCUMENTATION (OUTPATIENT)
Dept: NEUROSURGERY | Facility: CLINIC | Age: 78
End: 2018-11-05

## 2018-11-13 ENCOUNTER — LAB (OUTPATIENT)
Dept: LAB | Facility: CLINIC | Age: 78
End: 2018-11-13
Payer: MEDICARE

## 2018-11-13 ENCOUNTER — OFFICE VISIT (OUTPATIENT)
Dept: NEUROSURGERY | Facility: CLINIC | Age: 78
End: 2018-11-13
Payer: MEDICARE

## 2018-11-13 ENCOUNTER — APPOINTMENT (OUTPATIENT)
Dept: LAB | Facility: CLINIC | Age: 78
End: 2018-11-13
Payer: MEDICARE

## 2018-11-13 ENCOUNTER — TRANSCRIBE ORDERS (OUTPATIENT)
Dept: LAB | Facility: CLINIC | Age: 78
End: 2018-11-13

## 2018-11-13 VITALS
SYSTOLIC BLOOD PRESSURE: 153 MMHG | HEART RATE: 84 BPM | RESPIRATION RATE: 16 BRPM | BODY MASS INDEX: 30.21 KG/M2 | WEIGHT: 204 LBS | TEMPERATURE: 97.5 F | HEIGHT: 69 IN | DIASTOLIC BLOOD PRESSURE: 101 MMHG

## 2018-11-13 DIAGNOSIS — M43.16 SPONDYLOLISTHESIS OF LUMBAR REGION: Primary | ICD-10-CM

## 2018-11-13 DIAGNOSIS — M21.372 FOOT DROP, BILATERAL: ICD-10-CM

## 2018-11-13 DIAGNOSIS — M48.062 LUMBAR STENOSIS WITH NEUROGENIC CLAUDICATION: ICD-10-CM

## 2018-11-13 DIAGNOSIS — M21.371 BILATERAL FOOT-DROP: ICD-10-CM

## 2018-11-13 DIAGNOSIS — Z01.818 PRE-PROCEDURAL EXAMINATION: ICD-10-CM

## 2018-11-13 DIAGNOSIS — M43.16 SPONDYLOLISTHESIS OF LUMBAR REGION: ICD-10-CM

## 2018-11-13 DIAGNOSIS — M21.372 BILATERAL FOOT-DROP: ICD-10-CM

## 2018-11-13 DIAGNOSIS — M21.371 FOOT DROP, BILATERAL: ICD-10-CM

## 2018-11-13 LAB
ALBUMIN SERPL BCP-MCNC: 4.1 G/DL (ref 3.5–5)
ALP SERPL-CCNC: 95 U/L (ref 46–116)
ALT SERPL W P-5'-P-CCNC: 50 U/L (ref 12–78)
ANION GAP SERPL CALCULATED.3IONS-SCNC: 8 MMOL/L (ref 4–13)
APTT PPP: 41 SECONDS (ref 26–38)
AST SERPL W P-5'-P-CCNC: 32 U/L (ref 5–45)
BASOPHILS # BLD AUTO: 0.03 THOUSANDS/ΜL (ref 0–0.1)
BASOPHILS NFR BLD AUTO: 0 % (ref 0–1)
BILIRUB SERPL-MCNC: 0.4 MG/DL (ref 0.2–1)
BUN SERPL-MCNC: 27 MG/DL (ref 5–25)
CALCIUM SERPL-MCNC: 9.7 MG/DL (ref 8.3–10.1)
CHLORIDE SERPL-SCNC: 105 MMOL/L (ref 100–108)
CO2 SERPL-SCNC: 31 MMOL/L (ref 21–32)
CREAT SERPL-MCNC: 0.73 MG/DL (ref 0.6–1.3)
EOSINOPHIL # BLD AUTO: 0.24 THOUSAND/ΜL (ref 0–0.61)
EOSINOPHIL NFR BLD AUTO: 3 % (ref 0–6)
ERYTHROCYTE [DISTWIDTH] IN BLOOD BY AUTOMATED COUNT: 12.5 % (ref 11.6–15.1)
EST. AVERAGE GLUCOSE BLD GHB EST-MCNC: 131 MG/DL
GFR SERPL CREATININE-BSD FRML MDRD: 88 ML/MIN/1.73SQ M
GLUCOSE SERPL-MCNC: 100 MG/DL (ref 65–140)
HBA1C MFR BLD: 6.2 % (ref 4.2–6.3)
HCT VFR BLD AUTO: 47.3 % (ref 36.5–49.3)
HGB BLD-MCNC: 16.1 G/DL (ref 12–17)
IMM GRANULOCYTES # BLD AUTO: 0.01 THOUSAND/UL (ref 0–0.2)
IMM GRANULOCYTES NFR BLD AUTO: 0 % (ref 0–2)
INR PPP: 1.1 (ref 0.86–1.17)
LYMPHOCYTES # BLD AUTO: 2.29 THOUSANDS/ΜL (ref 0.6–4.47)
LYMPHOCYTES NFR BLD AUTO: 30 % (ref 14–44)
MCH RBC QN AUTO: 33.3 PG (ref 26.8–34.3)
MCHC RBC AUTO-ENTMCNC: 34 G/DL (ref 31.4–37.4)
MCV RBC AUTO: 98 FL (ref 82–98)
MONOCYTES # BLD AUTO: 0.46 THOUSAND/ΜL (ref 0.17–1.22)
MONOCYTES NFR BLD AUTO: 6 % (ref 4–12)
NEUTROPHILS # BLD AUTO: 4.53 THOUSANDS/ΜL (ref 1.85–7.62)
NEUTS SEG NFR BLD AUTO: 61 % (ref 43–75)
NRBC BLD AUTO-RTO: 0 /100 WBCS
PLATELET # BLD AUTO: 226 THOUSANDS/UL (ref 149–390)
PMV BLD AUTO: 10.1 FL (ref 8.9–12.7)
POTASSIUM SERPL-SCNC: 3.9 MMOL/L (ref 3.5–5.3)
PROT SERPL-MCNC: 7.8 G/DL (ref 6.4–8.2)
PROTHROMBIN TIME: 13.9 SECONDS (ref 11.8–14.2)
RBC # BLD AUTO: 4.84 MILLION/UL (ref 3.88–5.62)
SODIUM SERPL-SCNC: 144 MMOL/L (ref 136–145)
WBC # BLD AUTO: 7.56 THOUSAND/UL (ref 4.31–10.16)

## 2018-11-13 PROCEDURE — 85025 COMPLETE CBC W/AUTO DIFF WBC: CPT

## 2018-11-13 PROCEDURE — 83036 HEMOGLOBIN GLYCOSYLATED A1C: CPT

## 2018-11-13 PROCEDURE — 85610 PROTHROMBIN TIME: CPT

## 2018-11-13 PROCEDURE — 80053 COMPREHEN METABOLIC PANEL: CPT

## 2018-11-13 PROCEDURE — 36415 COLL VENOUS BLD VENIPUNCTURE: CPT

## 2018-11-13 PROCEDURE — 99215 OFFICE O/P EST HI 40 MIN: CPT | Performed by: NEUROLOGICAL SURGERY

## 2018-11-13 PROCEDURE — 87081 CULTURE SCREEN ONLY: CPT

## 2018-11-13 PROCEDURE — 85730 THROMBOPLASTIN TIME PARTIAL: CPT

## 2018-11-13 PROCEDURE — 93005 ELECTROCARDIOGRAM TRACING: CPT

## 2018-11-13 RX ORDER — OMEGA-3S/DHA/EPA/FISH OIL/D3 300MG-1000
400 CAPSULE ORAL DAILY
COMMUNITY

## 2018-11-13 NOTE — LETTER
November 13, 2018     Joya Barajas MD  [address]     Patient: Nupur Gaspar   YOB: 1940   Date of Visit: 11/13/2018       Dear Dr Jack Shepard: Thank you for referring Jeff Jorge to me for evaluation  Below are my notes for this consultation  If you have questions, please do not hesitate to call me  I look forward to following your patient along with you  Sincerely,        Salas Duncan MD        CC: No Recipients  Salas Duncan MD  11/13/2018  4:25 PM  Sign at close encounter  Office Note - Neurosurgery   Nupur Gaspar 77 y o  male MRN: [id number]      Assessment:    Patient is stable  Symptoms, as detailed in HPI, continue to significantly impact of patient's quality of life in daily activities  After carefully considering presentation, investigations, functional status and co-morbidities, the risk/benefit profile of surgical intervention is favorable  24-year-old gentleman with progressive lumbar neurogenic claudication secondary to L4-5 anterolisthesis and stenosis  MRI imaging reconfirm severe stenosis at L4-5 while flexion-extension film does demonstrate some mild amount of motion at his grade 1-2 anterolisthesis  We reviewed the risks, benefits alternatives to L4-5 posterior decompression with instrumented fixation fusion and possible transverse lumbar interbody fusion with possible extension to additional levels  Expected postoperative course, including activity restrictions, expected pain and postoperative medication were reviewed  Patient provided verbal consent to surgical procedure and signed consent form: Yes  He was instructed to bring his AFO with him to 8 in his postop ambulation and recovery  History, physical examination and diagnostic tests were reviewed and questions answered  Diagnosis, care plan and treatment options were discussed   The patient and spouse/SO understand instructions and will follow up as directed  Plan:    Follow-up:   Surgery    Problem List Items Addressed This Visit        Musculoskeletal and Integument    Spondylolisthesis of lumbar region - Primary       Other    Lumbar stenosis with neurogenic claudication    Foot drop, bilateral          Subjective/Objective     Chief Complaint    Difficulties with gait  HPI    78-year-old gentleman accompanied by his wife today  His symptoms not changed appreciably and he still finds it difficult to walk more than 2 blocks secondary to easy fatigability and weakness and clumsiness in his legs  He continues to have bilateral footdrop  He denies any difficulties with bowel bladder function or changing perineal sensation  He presents today to review the results of an up-to-date MRI of the lumbar spine and flexion-extension films lumbar spine  ROS    Review of Systems   Constitutional: Negative for chills, fatigue and fever  HENT: Negative  Eyes: Negative for pain and visual disturbance  Respiratory: Negative for cough, shortness of breath and wheezing  Cardiovascular: Negative for chest pain and palpitations  Gastrointestinal: Negative for abdominal pain and nausea  Genitourinary: Positive for difficulty urinating  Musculoskeletal: Positive for back pain (low grade, worse with activity) and gait problem (with prolonged walking, uses a cane for support)  Negative for arthralgias, neck pain and neck stiffness  Neurological: Positive for weakness (bialteral LE, worse with activity) and numbness  Negative for dizziness, speech difficulty and headaches  Family History    No family history on file  Social History    Social History     Social History    Marital status: Single     Spouse name: N/A    Number of children: N/A    Years of education: N/A     Occupational History    Not on file       Social History Main Topics    Smoking status: Former Smoker    Smokeless tobacco: Never Used    Alcohol use Yes    Drug use: No    Sexual activity: Not on file     Other Topics Concern    Not on file     Social History Narrative    No narrative on file       Past Medical History    Past Medical History:   Diagnosis Date    History of chicken pox     Kidney stones     Measles        Surgical History    Past Surgical History:   Procedure Laterality Date    KIDNEY STONE SURGERY         Medications      Current Outpatient Prescriptions:     acetaminophen (TYLENOL) 500 mg tablet, Take 1,000 mg by mouth every 6 (six) hours as needed, Disp: , Rfl:     cholecalciferol (VITAMIN D3) 400 units tablet, Take 400 Units by mouth daily, Disp: , Rfl:     Cyanocobalamin (VITAMIN B-12) 5000 MCG TBDP, Take by mouth daily, Disp: , Rfl:     multivitamin (THERAGRAN) TABS, Take 1 tablet by mouth daily, Disp: , Rfl:     Allergies    Allergies   Allergen Reactions    Iodine Rash       The following portions of the patient's history were reviewed and updated as appropriate: allergies, current medications, past family history, past medical history, past social history, past surgical history and problem list     Investigations    I personally reviewed the MRI and XRAY results with the patient:    MRI of the lumbar spine without contrast dated November 7th, 2018  Grade 1-2 degenerative anterolisthesis at L4-5  This results in severe central and bilateral lateral recess stenosis  No other areas of significant neural compression  Intrathecal contents unremarkable  Upright flexion-extension film lumbar spine dated October 29, 2018  Grade 1-2 degenerative anterolisthesis at L4-5  Mild widening of the L4-5 disc space on flexion which reduces in extension  Degenerative changes throughout the remainder of the lumbar spine  Complete loss of disc height at L5-S1  Physical Exam    Vitals:  Blood pressure (!) 153/101, pulse 84, temperature 97 5 °F (36 4 °C), temperature source Tympanic, resp   rate 16, height 5' 9" (1 753 m), weight 92 5 kg (204 lb)  ,Body mass index is 30 13 kg/m²  Physical Exam   Constitutional: He is oriented to person, place, and time  He appears well-developed and well-nourished  No distress  Cardiovascular: Normal rate, regular rhythm and normal heart sounds  Pulmonary/Chest: Effort normal and breath sounds normal    Musculoskeletal:   Full 5/5 power in lower extremities aside from bilateral footdrop  Reports normal light touch and pinprick sensation lower extremities  Walks with a steppage gait to compensate for bilateral footdrop  Neurological: He is alert and oriented to person, place, and time  Skin: Skin is warm and dry  Vitals reviewed  Neurologic Exam     Mental Status   Oriented to person, place, and time

## 2018-11-13 NOTE — PROGRESS NOTES
Office Note - Neurosurgery   Lavern Figueroa 77 y o  male MRN: [id number]      Assessment:    Patient is stable  Symptoms, as detailed in HPI, continue to significantly impact of patient's quality of life in daily activities  After carefully considering presentation, investigations, functional status and co-morbidities, the risk/benefit profile of surgical intervention is favorable  30-year-old gentleman with progressive lumbar neurogenic claudication secondary to L4-5 anterolisthesis and stenosis  MRI imaging reconfirm severe stenosis at L4-5 while flexion-extension film does demonstrate some mild amount of motion at his grade 1-2 anterolisthesis  We reviewed the risks, benefits alternatives to L4-5 posterior decompression with instrumented fixation fusion and possible transverse lumbar interbody fusion with possible extension to additional levels  Expected postoperative course, including activity restrictions, expected pain and postoperative medication were reviewed  Patient provided verbal consent to surgical procedure and signed consent form: Yes  He was instructed to bring his AFO with him to 8 in his postop ambulation and recovery  History, physical examination and diagnostic tests were reviewed and questions answered  Diagnosis, care plan and treatment options were discussed  The patient and spouse/SO understand instructions and will follow up as directed  Plan:    Follow-up:   Surgery    Problem List Items Addressed This Visit        Musculoskeletal and Integument    Spondylolisthesis of lumbar region - Primary       Other    Lumbar stenosis with neurogenic claudication    Foot drop, bilateral          Subjective/Objective     Chief Complaint    Difficulties with gait  HPI    30-year-old gentleman accompanied by his wife today    His symptoms not changed appreciably and he still finds it difficult to walk more than 2 blocks secondary to easy fatigability and weakness and clumsiness in his legs  He continues to have bilateral footdrop  He denies any difficulties with bowel bladder function or changing perineal sensation  He presents today to review the results of an up-to-date MRI of the lumbar spine and flexion-extension films lumbar spine  ROS    Review of Systems   Constitutional: Negative for chills, fatigue and fever  HENT: Negative  Eyes: Negative for pain and visual disturbance  Respiratory: Negative for cough, shortness of breath and wheezing  Cardiovascular: Negative for chest pain and palpitations  Gastrointestinal: Negative for abdominal pain and nausea  Genitourinary: Positive for difficulty urinating  Musculoskeletal: Positive for back pain (low grade, worse with activity) and gait problem (with prolonged walking, uses a cane for support)  Negative for arthralgias, neck pain and neck stiffness  Neurological: Positive for weakness (bialteral LE, worse with activity) and numbness  Negative for dizziness, speech difficulty and headaches  Family History    No family history on file  Social History    Social History     Social History    Marital status: Single     Spouse name: N/A    Number of children: N/A    Years of education: N/A     Occupational History    Not on file       Social History Main Topics    Smoking status: Former Smoker    Smokeless tobacco: Never Used    Alcohol use Yes    Drug use: No    Sexual activity: Not on file     Other Topics Concern    Not on file     Social History Narrative    No narrative on file       Past Medical History    Past Medical History:   Diagnosis Date    History of chicken pox     Kidney stones     Measles        Surgical History    Past Surgical History:   Procedure Laterality Date    KIDNEY STONE SURGERY         Medications      Current Outpatient Prescriptions:     acetaminophen (TYLENOL) 500 mg tablet, Take 1,000 mg by mouth every 6 (six) hours as needed, Disp: , Rfl:    cholecalciferol (VITAMIN D3) 400 units tablet, Take 400 Units by mouth daily, Disp: , Rfl:     Cyanocobalamin (VITAMIN B-12) 5000 MCG TBDP, Take by mouth daily, Disp: , Rfl:     multivitamin (THERAGRAN) TABS, Take 1 tablet by mouth daily, Disp: , Rfl:     Allergies    Allergies   Allergen Reactions    Iodine Rash       The following portions of the patient's history were reviewed and updated as appropriate: allergies, current medications, past family history, past medical history, past social history, past surgical history and problem list     Investigations    I personally reviewed the MRI and XRAY results with the patient:    MRI of the lumbar spine without contrast dated November 7th, 2018  Grade 1-2 degenerative anterolisthesis at L4-5  This results in severe central and bilateral lateral recess stenosis  No other areas of significant neural compression  Intrathecal contents unremarkable  Upright flexion-extension film lumbar spine dated October 29, 2018  Grade 1-2 degenerative anterolisthesis at L4-5  Mild widening of the L4-5 disc space on flexion which reduces in extension  Degenerative changes throughout the remainder of the lumbar spine  Complete loss of disc height at L5-S1  Physical Exam    Vitals:  Blood pressure (!) 153/101, pulse 84, temperature 97 5 °F (36 4 °C), temperature source Tympanic, resp  rate 16, height 5' 9" (1 753 m), weight 92 5 kg (204 lb)  ,Body mass index is 30 13 kg/m²  Physical Exam   Constitutional: He is oriented to person, place, and time  He appears well-developed and well-nourished  No distress  Cardiovascular: Normal rate, regular rhythm and normal heart sounds  Pulmonary/Chest: Effort normal and breath sounds normal    Musculoskeletal:   Full 5/5 power in lower extremities aside from bilateral footdrop  Reports normal light touch and pinprick sensation lower extremities  Walks with a steppage gait to compensate for bilateral footdrop     Neurological: He is alert and oriented to person, place, and time  Skin: Skin is warm and dry  Vitals reviewed  Neurologic Exam     Mental Status   Oriented to person, place, and time

## 2018-11-14 LAB
ATRIAL RATE: 71 BPM
MRSA NOSE QL CULT: NORMAL
P AXIS: 37 DEGREES
PR INTERVAL: 172 MS
QRS AXIS: -36 DEGREES
QRSD INTERVAL: 120 MS
QT INTERVAL: 406 MS
QTC INTERVAL: 441 MS
T WAVE AXIS: 7 DEGREES
VENTRICULAR RATE: 71 BPM

## 2018-11-14 PROCEDURE — 93010 ELECTROCARDIOGRAM REPORT: CPT | Performed by: INTERNAL MEDICINE

## 2018-11-16 DIAGNOSIS — M48.062 LUMBAR STENOSIS WITH NEUROGENIC CLAUDICATION: ICD-10-CM

## 2018-11-16 DIAGNOSIS — M43.16 SPONDYLOLISTHESIS OF LUMBAR REGION: ICD-10-CM

## 2018-11-20 PROCEDURE — 1124F ACP DISCUSS-NO DSCNMKR DOCD: CPT | Performed by: PHYSICIAN ASSISTANT

## 2018-12-07 ENCOUNTER — ANESTHESIA EVENT (OUTPATIENT)
Dept: PERIOP | Facility: HOSPITAL | Age: 78
End: 2018-12-07
Payer: MEDICARE

## 2018-12-07 ENCOUNTER — DOCUMENTATION (OUTPATIENT)
Dept: NEUROSURGERY | Facility: CLINIC | Age: 78
End: 2018-12-07

## 2018-12-10 ENCOUNTER — ANESTHESIA (OUTPATIENT)
Dept: PERIOP | Facility: HOSPITAL | Age: 78
End: 2018-12-10
Payer: MEDICARE

## 2018-12-10 ENCOUNTER — APPOINTMENT (OUTPATIENT)
Dept: RADIOLOGY | Facility: HOSPITAL | Age: 78
End: 2018-12-10
Payer: MEDICARE

## 2018-12-10 ENCOUNTER — HOSPITAL ENCOUNTER (OUTPATIENT)
Facility: HOSPITAL | Age: 78
Setting detail: OBSERVATION
Discharge: HOME | End: 2018-12-12
Attending: NEUROLOGICAL SURGERY | Admitting: NEUROLOGICAL SURGERY
Payer: MEDICARE

## 2018-12-10 DIAGNOSIS — M48.062 LUMBAR STENOSIS WITH NEUROGENIC CLAUDICATION: ICD-10-CM

## 2018-12-10 DIAGNOSIS — Z98.890 POSTOPERATIVE STATE: Primary | ICD-10-CM

## 2018-12-10 DIAGNOSIS — M43.16 SPONDYLOLISTHESIS OF LUMBAR REGION: ICD-10-CM

## 2018-12-10 LAB
ABO GROUP BLD: NORMAL
BLD GP AB SCN SERPL QL: NEGATIVE
GLUCOSE SERPL-MCNC: 146 MG/DL (ref 65–140)
HCT VFR BLD AUTO: 38.4 % (ref 36.5–49.3)
HGB BLD-MCNC: 13.1 G/DL (ref 12–17)
PLATELET # BLD AUTO: 205 THOUSANDS/UL (ref 149–390)
PMV BLD AUTO: 10.4 FL (ref 8.9–12.7)
RH BLD: POSITIVE
SPECIMEN EXPIRATION DATE: NORMAL

## 2018-12-10 PROCEDURE — 20930 SP BONE ALGRFT MORSEL ADD-ON: CPT | Performed by: NEUROLOGICAL SURGERY

## 2018-12-10 PROCEDURE — 85049 AUTOMATED PLATELET COUNT: CPT | Performed by: NEUROLOGICAL SURGERY

## 2018-12-10 PROCEDURE — 86901 BLOOD TYPING SEROLOGIC RH(D): CPT | Performed by: NEUROLOGICAL SURGERY

## 2018-12-10 PROCEDURE — 82948 REAGENT STRIP/BLOOD GLUCOSE: CPT

## 2018-12-10 PROCEDURE — 22853 INSJ BIOMECHANICAL DEVICE: CPT | Performed by: NEUROLOGICAL SURGERY

## 2018-12-10 PROCEDURE — 72100 X-RAY EXAM L-S SPINE 2/3 VWS: CPT

## 2018-12-10 PROCEDURE — 86900 BLOOD TYPING SEROLOGIC ABO: CPT | Performed by: NEUROLOGICAL SURGERY

## 2018-12-10 PROCEDURE — 85014 HEMATOCRIT: CPT | Performed by: PHYSICIAN ASSISTANT

## 2018-12-10 PROCEDURE — 86850 RBC ANTIBODY SCREEN: CPT | Performed by: NEUROLOGICAL SURGERY

## 2018-12-10 PROCEDURE — C1713 ANCHOR/SCREW BN/BN,TIS/BN: HCPCS | Performed by: NEUROLOGICAL SURGERY

## 2018-12-10 PROCEDURE — 22633 ARTHRD CMBN 1NTRSPC LUMBAR: CPT | Performed by: NEUROLOGICAL SURGERY

## 2018-12-10 PROCEDURE — 20936 SP BONE AGRFT LOCAL ADD-ON: CPT | Performed by: NEUROLOGICAL SURGERY

## 2018-12-10 PROCEDURE — 22102 REMOVE PART LUMBAR VERTEBRA: CPT | Performed by: NEUROLOGICAL SURGERY

## 2018-12-10 PROCEDURE — 22840 INSERT SPINE FIXATION DEVICE: CPT | Performed by: NEUROLOGICAL SURGERY

## 2018-12-10 PROCEDURE — 85018 HEMOGLOBIN: CPT | Performed by: PHYSICIAN ASSISTANT

## 2018-12-10 DEVICE — SCREW 54840006550 MAS 6.5X50 CC
Type: IMPLANTABLE DEVICE | Site: SPINE LUMBAR | Status: FUNCTIONAL
Brand: CD HORIZON® SPINAL SYSTEM

## 2018-12-10 DEVICE — SPACER 7770723 ELEVATE X-LOR 23X7MM
Type: IMPLANTABLE DEVICE | Site: SPINE LUMBAR | Status: FUNCTIONAL
Brand: ELEVATE™ SPINAL SYSTEM

## 2018-12-10 RX ORDER — SODIUM CHLORIDE, SODIUM LACTATE, POTASSIUM CHLORIDE, CALCIUM CHLORIDE 600; 310; 30; 20 MG/100ML; MG/100ML; MG/100ML; MG/100ML
100 INJECTION, SOLUTION INTRAVENOUS CONTINUOUS
Status: DISCONTINUED | OUTPATIENT
Start: 2018-12-10 | End: 2018-12-11

## 2018-12-10 RX ORDER — CHLORHEXIDINE GLUCONATE 0.12 MG/ML
15 RINSE ORAL ONCE
Status: COMPLETED | OUTPATIENT
Start: 2018-12-10 | End: 2018-12-10

## 2018-12-10 RX ORDER — CEFAZOLIN SODIUM 2 G/50ML
2000 SOLUTION INTRAVENOUS ONCE
Status: COMPLETED | OUTPATIENT
Start: 2018-12-10 | End: 2018-12-10

## 2018-12-10 RX ORDER — DOCUSATE SODIUM 100 MG/1
100 CAPSULE, LIQUID FILLED ORAL 2 TIMES DAILY
Status: DISCONTINUED | OUTPATIENT
Start: 2018-12-10 | End: 2018-12-12 | Stop reason: HOSPADM

## 2018-12-10 RX ORDER — SODIUM CHLORIDE 9 MG/ML
INJECTION, SOLUTION INTRAVENOUS CONTINUOUS PRN
Status: DISCONTINUED | OUTPATIENT
Start: 2018-12-10 | End: 2018-12-10

## 2018-12-10 RX ORDER — PROMETHAZINE HYDROCHLORIDE 25 MG/ML
12.5 INJECTION, SOLUTION INTRAMUSCULAR; INTRAVENOUS ONCE AS NEEDED
Status: DISCONTINUED | OUTPATIENT
Start: 2018-12-10 | End: 2018-12-10 | Stop reason: HOSPADM

## 2018-12-10 RX ORDER — ALBUMIN, HUMAN INJ 5% 5 %
SOLUTION INTRAVENOUS CONTINUOUS PRN
Status: DISCONTINUED | OUTPATIENT
Start: 2018-12-10 | End: 2018-12-10

## 2018-12-10 RX ORDER — PROPOFOL 10 MG/ML
INJECTION, EMULSION INTRAVENOUS AS NEEDED
Status: DISCONTINUED | OUTPATIENT
Start: 2018-12-10 | End: 2018-12-10 | Stop reason: SURG

## 2018-12-10 RX ORDER — SUCCINYLCHOLINE CHLORIDE 20 MG/ML
INJECTION INTRAMUSCULAR; INTRAVENOUS AS NEEDED
Status: DISCONTINUED | OUTPATIENT
Start: 2018-12-10 | End: 2018-12-10 | Stop reason: SURG

## 2018-12-10 RX ORDER — HYDROMORPHONE HCL/PF 1 MG/ML
0.5 SYRINGE (ML) INJECTION
Status: DISCONTINUED | OUTPATIENT
Start: 2018-12-10 | End: 2018-12-10 | Stop reason: HOSPADM

## 2018-12-10 RX ORDER — ROCURONIUM BROMIDE 10 MG/ML
INJECTION, SOLUTION INTRAVENOUS AS NEEDED
Status: DISCONTINUED | OUTPATIENT
Start: 2018-12-10 | End: 2018-12-10 | Stop reason: SURG

## 2018-12-10 RX ORDER — NEOSTIGMINE METHYLSULFATE 1 MG/ML
INJECTION INTRAVENOUS AS NEEDED
Status: DISCONTINUED | OUTPATIENT
Start: 2018-12-10 | End: 2018-12-10 | Stop reason: SURG

## 2018-12-10 RX ORDER — OMEGA-3S/DHA/EPA/FISH OIL/D3 300MG-1000
400 CAPSULE ORAL DAILY
Status: DISCONTINUED | OUTPATIENT
Start: 2018-12-10 | End: 2018-12-12 | Stop reason: HOSPADM

## 2018-12-10 RX ORDER — ONDANSETRON 2 MG/ML
INJECTION INTRAMUSCULAR; INTRAVENOUS AS NEEDED
Status: DISCONTINUED | OUTPATIENT
Start: 2018-12-10 | End: 2018-12-10 | Stop reason: SURG

## 2018-12-10 RX ORDER — OXYCODONE HYDROCHLORIDE 10 MG/1
10 TABLET ORAL EVERY 4 HOURS PRN
Status: DISCONTINUED | OUTPATIENT
Start: 2018-12-10 | End: 2018-12-12 | Stop reason: HOSPADM

## 2018-12-10 RX ORDER — SODIUM CHLORIDE, SODIUM LACTATE, POTASSIUM CHLORIDE, CALCIUM CHLORIDE 600; 310; 30; 20 MG/100ML; MG/100ML; MG/100ML; MG/100ML
INJECTION, SOLUTION INTRAVENOUS CONTINUOUS PRN
Status: DISCONTINUED | OUTPATIENT
Start: 2018-12-10 | End: 2018-12-10

## 2018-12-10 RX ORDER — SENNOSIDES 8.6 MG
1 TABLET ORAL DAILY
Status: DISCONTINUED | OUTPATIENT
Start: 2018-12-11 | End: 2018-12-12 | Stop reason: HOSPADM

## 2018-12-10 RX ORDER — VANCOMYCIN HYDROCHLORIDE 1 G/20ML
INJECTION, POWDER, LYOPHILIZED, FOR SOLUTION INTRAVENOUS AS NEEDED
Status: DISCONTINUED | OUTPATIENT
Start: 2018-12-10 | End: 2018-12-10 | Stop reason: HOSPADM

## 2018-12-10 RX ORDER — GLYCOPYRROLATE 0.2 MG/ML
INJECTION INTRAMUSCULAR; INTRAVENOUS AS NEEDED
Status: DISCONTINUED | OUTPATIENT
Start: 2018-12-10 | End: 2018-12-10 | Stop reason: SURG

## 2018-12-10 RX ORDER — HYDROMORPHONE HCL/PF 1 MG/ML
0.5 SYRINGE (ML) INJECTION
Status: DISCONTINUED | OUTPATIENT
Start: 2018-12-10 | End: 2018-12-11

## 2018-12-10 RX ORDER — ACETAMINOPHEN 325 MG/1
975 TABLET ORAL EVERY 8 HOURS SCHEDULED
Status: DISCONTINUED | OUTPATIENT
Start: 2018-12-10 | End: 2018-12-12 | Stop reason: HOSPADM

## 2018-12-10 RX ORDER — FENTANYL CITRATE/PF 50 MCG/ML
25 SYRINGE (ML) INJECTION
Status: COMPLETED | OUTPATIENT
Start: 2018-12-10 | End: 2018-12-10

## 2018-12-10 RX ORDER — PROPOFOL 10 MG/ML
INJECTION, EMULSION INTRAVENOUS CONTINUOUS PRN
Status: DISCONTINUED | OUTPATIENT
Start: 2018-12-10 | End: 2018-12-10

## 2018-12-10 RX ORDER — FENTANYL CITRATE 50 UG/ML
INJECTION, SOLUTION INTRAMUSCULAR; INTRAVENOUS AS NEEDED
Status: DISCONTINUED | OUTPATIENT
Start: 2018-12-10 | End: 2018-12-10 | Stop reason: SURG

## 2018-12-10 RX ORDER — EPHEDRINE SULFATE 50 MG/ML
INJECTION, SOLUTION INTRAVENOUS AS NEEDED
Status: DISCONTINUED | OUTPATIENT
Start: 2018-12-10 | End: 2018-12-10 | Stop reason: SURG

## 2018-12-10 RX ORDER — ACETAMINOPHEN 160 MG
TABLET,DISINTEGRATING ORAL AS NEEDED
Status: DISCONTINUED | OUTPATIENT
Start: 2018-12-10 | End: 2018-12-10 | Stop reason: HOSPADM

## 2018-12-10 RX ORDER — BUPIVACAINE HYDROCHLORIDE AND EPINEPHRINE 5; 5 MG/ML; UG/ML
INJECTION, SOLUTION EPIDURAL; INTRACAUDAL; PERINEURAL AS NEEDED
Status: DISCONTINUED | OUTPATIENT
Start: 2018-12-10 | End: 2018-12-10 | Stop reason: HOSPADM

## 2018-12-10 RX ORDER — LIDOCAINE HYDROCHLORIDE 10 MG/ML
INJECTION, SOLUTION INFILTRATION; PERINEURAL AS NEEDED
Status: DISCONTINUED | OUTPATIENT
Start: 2018-12-10 | End: 2018-12-10 | Stop reason: SURG

## 2018-12-10 RX ORDER — CHOLECALCIFEROL (VITAMIN D3) 125 MCG
500 CAPSULE ORAL DAILY
Status: DISCONTINUED | OUTPATIENT
Start: 2018-12-10 | End: 2018-12-12 | Stop reason: HOSPADM

## 2018-12-10 RX ORDER — CEFAZOLIN SODIUM 2 G/50ML
2000 SOLUTION INTRAVENOUS EVERY 8 HOURS
Status: COMPLETED | OUTPATIENT
Start: 2018-12-10 | End: 2018-12-11

## 2018-12-10 RX ORDER — LIDOCAINE HYDROCHLORIDE AND EPINEPHRINE 10; 10 MG/ML; UG/ML
INJECTION, SOLUTION INFILTRATION; PERINEURAL AS NEEDED
Status: DISCONTINUED | OUTPATIENT
Start: 2018-12-10 | End: 2018-12-10 | Stop reason: HOSPADM

## 2018-12-10 RX ORDER — OXYCODONE HYDROCHLORIDE 5 MG/1
5 TABLET ORAL EVERY 4 HOURS PRN
Status: DISCONTINUED | OUTPATIENT
Start: 2018-12-10 | End: 2018-12-12 | Stop reason: HOSPADM

## 2018-12-10 RX ORDER — METHOCARBAMOL 500 MG/1
500 TABLET, FILM COATED ORAL EVERY 6 HOURS SCHEDULED
Status: DISCONTINUED | OUTPATIENT
Start: 2018-12-10 | End: 2018-12-12 | Stop reason: HOSPADM

## 2018-12-10 RX ORDER — ONDANSETRON 2 MG/ML
4 INJECTION INTRAMUSCULAR; INTRAVENOUS EVERY 6 HOURS PRN
Status: DISCONTINUED | OUTPATIENT
Start: 2018-12-10 | End: 2018-12-12 | Stop reason: HOSPADM

## 2018-12-10 RX ORDER — GABAPENTIN 300 MG/1
300 CAPSULE ORAL 2 TIMES DAILY
Status: DISCONTINUED | OUTPATIENT
Start: 2018-12-10 | End: 2018-12-12 | Stop reason: HOSPADM

## 2018-12-10 RX ADMIN — ACETAMINOPHEN 975 MG: 325 TABLET, FILM COATED ORAL at 13:55

## 2018-12-10 RX ADMIN — PROPOFOL 120 MCG/KG/MIN: 10 INJECTION, EMULSION INTRAVENOUS at 07:40

## 2018-12-10 RX ADMIN — ROCURONIUM BROMIDE 2 MG: 10 INJECTION INTRAVENOUS at 07:39

## 2018-12-10 RX ADMIN — FENTANYL CITRATE 25 MCG: 50 INJECTION, SOLUTION INTRAMUSCULAR; INTRAVENOUS at 12:25

## 2018-12-10 RX ADMIN — DEXAMETHASONE SODIUM PHOSPHATE 8 MG: 10 INJECTION INTRAMUSCULAR; INTRAVENOUS at 07:40

## 2018-12-10 RX ADMIN — EPHEDRINE SULFATE 10 MG: 50 INJECTION, SOLUTION INTRAMUSCULAR; INTRAVENOUS; SUBCUTANEOUS at 07:50

## 2018-12-10 RX ADMIN — FENTANYL CITRATE 25 MCG: 50 INJECTION, SOLUTION INTRAMUSCULAR; INTRAVENOUS at 12:35

## 2018-12-10 RX ADMIN — ALBUMIN (HUMAN): 12.5 SOLUTION INTRAVENOUS at 07:53

## 2018-12-10 RX ADMIN — DOCUSATE SODIUM 100 MG: 100 CAPSULE, LIQUID FILLED ORAL at 17:08

## 2018-12-10 RX ADMIN — PROPOFOL 150 MG: 10 INJECTION, EMULSION INTRAVENOUS at 07:40

## 2018-12-10 RX ADMIN — CHOLECALCIFEROL TAB 10 MCG (400 UNIT) 400 UNITS: 10 TAB at 13:56

## 2018-12-10 RX ADMIN — ONDANSETRON 4 MG: 2 INJECTION INTRAMUSCULAR; INTRAVENOUS at 11:04

## 2018-12-10 RX ADMIN — CHLORHEXIDINE GLUCONATE 15 ML: 1.2 RINSE ORAL at 06:06

## 2018-12-10 RX ADMIN — SODIUM CHLORIDE, SODIUM LACTATE, POTASSIUM CHLORIDE, AND CALCIUM CHLORIDE 100 ML/HR: .6; .31; .03; .02 INJECTION, SOLUTION INTRAVENOUS at 12:22

## 2018-12-10 RX ADMIN — REMIFENTANIL HYDROCHLORIDE 0.15 MCG/KG/MIN: 1 INJECTION, POWDER, LYOPHILIZED, FOR SOLUTION INTRAVENOUS at 07:40

## 2018-12-10 RX ADMIN — GLYCOPYRROLATE 0.2 MG: 0.2 INJECTION, SOLUTION INTRAMUSCULAR; INTRAVENOUS at 07:40

## 2018-12-10 RX ADMIN — SUCCINYLCHOLINE CHLORIDE 100 MG: 20 INJECTION, SOLUTION INTRAMUSCULAR; INTRAVENOUS at 07:40

## 2018-12-10 RX ADMIN — EPHEDRINE SULFATE 5 MG: 50 INJECTION, SOLUTION INTRAMUSCULAR; INTRAVENOUS; SUBCUTANEOUS at 08:08

## 2018-12-10 RX ADMIN — FENTANYL CITRATE 25 MCG: 50 INJECTION, SOLUTION INTRAMUSCULAR; INTRAVENOUS at 12:05

## 2018-12-10 RX ADMIN — ACETAMINOPHEN 975 MG: 325 TABLET, FILM COATED ORAL at 21:41

## 2018-12-10 RX ADMIN — SODIUM CHLORIDE: 9 INJECTION, SOLUTION INTRAVENOUS at 07:41

## 2018-12-10 RX ADMIN — OXYCODONE HYDROCHLORIDE 5 MG: 5 TABLET ORAL at 13:55

## 2018-12-10 RX ADMIN — CEFAZOLIN SODIUM 2000 MG: 2 SOLUTION INTRAVENOUS at 08:13

## 2018-12-10 RX ADMIN — NEOSTIGMINE METHYLSULFATE 2 MG: 1 INJECTION INTRAVENOUS at 11:03

## 2018-12-10 RX ADMIN — LIDOCAINE HYDROCHLORIDE 50 MG: 10 INJECTION, SOLUTION INFILTRATION; PERINEURAL at 07:40

## 2018-12-10 RX ADMIN — METHOCARBAMOL 500 MG: 500 TABLET, FILM COATED ORAL at 17:09

## 2018-12-10 RX ADMIN — CEFAZOLIN SODIUM 2000 MG: 2 SOLUTION INTRAVENOUS at 17:54

## 2018-12-10 RX ADMIN — EPHEDRINE SULFATE 5 MG: 50 INJECTION, SOLUTION INTRAMUSCULAR; INTRAVENOUS; SUBCUTANEOUS at 07:40

## 2018-12-10 RX ADMIN — PHENYLEPHRINE HYDROCHLORIDE 25 MCG/MIN: 10 INJECTION INTRAVENOUS at 08:34

## 2018-12-10 RX ADMIN — ROCURONIUM BROMIDE 20 MG: 10 INJECTION INTRAVENOUS at 08:33

## 2018-12-10 RX ADMIN — GLYCOPYRROLATE 0.2 MG: 0.2 INJECTION, SOLUTION INTRAMUSCULAR; INTRAVENOUS at 11:03

## 2018-12-10 RX ADMIN — EPHEDRINE SULFATE 10 MG: 50 INJECTION, SOLUTION INTRAMUSCULAR; INTRAVENOUS; SUBCUTANEOUS at 07:52

## 2018-12-10 RX ADMIN — SODIUM CHLORIDE, SODIUM LACTATE, POTASSIUM CHLORIDE, AND CALCIUM CHLORIDE: .6; .31; .03; .02 INJECTION, SOLUTION INTRAVENOUS at 07:10

## 2018-12-10 RX ADMIN — EPHEDRINE SULFATE 10 MG: 50 INJECTION, SOLUTION INTRAMUSCULAR; INTRAVENOUS; SUBCUTANEOUS at 08:29

## 2018-12-10 RX ADMIN — FENTANYL CITRATE 25 MCG: 50 INJECTION, SOLUTION INTRAMUSCULAR; INTRAVENOUS at 12:00

## 2018-12-10 RX ADMIN — GABAPENTIN 300 MG: 300 CAPSULE ORAL at 17:09

## 2018-12-10 RX ADMIN — FENTANYL CITRATE 100 MCG: 50 INJECTION, SOLUTION INTRAMUSCULAR; INTRAVENOUS at 07:40

## 2018-12-10 RX ADMIN — ALBUMIN (HUMAN): 12.5 SOLUTION INTRAVENOUS at 08:57

## 2018-12-10 RX ADMIN — CYANOCOBALAMIN TAB 500 MCG 500 MCG: 500 TAB at 13:55

## 2018-12-10 RX ADMIN — EPHEDRINE SULFATE 5 MG: 50 INJECTION, SOLUTION INTRAMUSCULAR; INTRAVENOUS; SUBCUTANEOUS at 08:11

## 2018-12-10 NOTE — PERIOPERATIVE NURSING NOTE
Noted mod amt bloody drainage noted on lower aspect of back dressing  Dr Nani Lauren called and made aware  OK to take down dressing and reinforce it

## 2018-12-10 NOTE — OP NOTE
OPERATIVE REPORT  PATIENT NAME: Laila Moore    :  1940  MRN: [id number]  Pt Location: [facility] OR ROOM 17    SURGERY DATE: 12/10/2018    Surgeon(s) and Role:     * Kelsie Lombardo MD - Primary     * Arjun Beckham PA-C - Assisting    No qualified resident was available  ELISA was present for the procedure, and provided essential assistance with proper exposure, retraction, hemostasis, instrumentation and decompression, and wound closure, which was necessary secondary to the complex nature of this case  Preop Diagnosis:  Spondylolisthesis of lumbar region [M43 16]  Lumbar stenosis with neurogenic claudication [M48 062]  Foot drop, bilateral [M21 371, M21 372]    Post-Op Diagnosis Codes: * Spondylolisthesis of lumbar region [M43 16]     * Lumbar stenosis with neurogenic claudication [M48 062]     * Foot drop, bilateral [M21 371, M21 372]    Procedure:  1  L4-5 posterior instrumented fixation with Medtronic Solara system and placement of elevate interbody graft with locally harvested autograft for fusion and posterior decompression  Specimen(s):  * No specimens in log *    Estimated Blood Loss:   100 mL    Drains:  Closed/Suction Drain Midline Back Bulb 7 Fr  (Active)   Number of days: 0       [REMOVED] Urethral Catheter Latex; Other (Comment) 16 Fr  (Removed)   Number of days: 0       Anesthesia Type:   General    Operative Indications:  Spondylolisthesis of lumbar region [M43 16]  Lumbar stenosis with neurogenic claudication [M48 062]  Foot drop, bilateral [M21 371, M21 372]    Operative Findings:  Severe central and bilateral lateral recess stenosis at L4-5 secondary to facet ligamentous hypertrophy  Ligamentum flavum on the left was densely adherent to the dura  Complications:   None    Procedure and Technique:  Clinical Note:    The goals and alternatives to the procedure above were discussed with patient and family   Surgery is intended to decompress neural structures and hopefully improve radicular pain  Weakness, numbness and back pain are less likely to improve  The risks of surgery were described in detail  1  Risk of general anesthetic, with possible cardiac and respiratory complication  There is risk of infection and bleeding  2  Risk of neurological injury with new pain, weakness or numbness or difficulties with bowel and bladder function  The risk of CSF leak was described  3  Possible need for revision surgery in the future  Once all questions were answered to their satisfaction, they asked to proceed with surgery  Operating Room Note    The patient was brought to the operating room and placed under general anesthetic  Once all appropriate lines were in position, the patient was carefully turned in the prone position onto a Av  Care was taken to insure that all pressure points were padded and the neck was in a neutral position  AP and lateral fluoroscopy were used to  Identified midline of the lumbar spine and check sagital alignment  Based on lateral fluoroscopy a 7 centimeter midline incision was planned  The skin was then prepped and draped in usual sterile fashion  A full surgical timeout was undertaken identifying the site, side and level of surgery  The patient received preoperative antibiotics  One percent lidocaine with 100,000 of epinephrine was used to infiltrate the soft tissue  10 blade was used to incise the skin  Monopolar cautery was used to dissect down and through the muscle fascia  Subperiosteal dissection was undertaken along the spinous processes, lamina and pars bilaterally using monopolar cautery and aquaMantis  Lateral fluoroscopy was then used to localize the surgical level  A  Matchstick brooks was used to drill a  hole at the midpoint of the transverse process lateral to the pars on the right at L 4  A pedicle finder was advanced under lateral fluoroscopic guidance   This image was used for definitive local localization with neuroradiology  The pedicle finder was removed and the hole was probed to ensure that there was a floor and 4 walls  A tap was then used to prepare the hole  A 6 5 x 50 millimeter pedicle screw was then placed under lateral fluoroscopic guidance  In a similar fashion, 6 5 x 50 mm screw was placed on the left at L4 and bilaterally at L5    AP and lateral fluoroscopy demonstrated good alignment of pedicle screws  All screws stimulated above threshold  Satisfied with instrumentation, I turned my attention to decompression  Ball bur was used to thin the lamina of L4 and L5 and the medial facets at L4-5 bilaterally  Curved curette was used to undermine the ligamentum flavum  Kerrison punch was used to remove the lamina of L5 and the inferior half lamina of L4 bilaterally and the medial facets bilaterally  A match stick bur was then used to further remove the facet on the left at L4/5 for a total facet osteotomy on the left at this level  The Penfield 4 was used to dissect the ligamentum flavum from the thecal sac at L4-5 on the right and a Kerrison punch was then used to remove ligamentum flavum  A foraminotomy was performed on the right at L4-5  On the left I could not dissect the ligamentum flavum off the dura as it was quite adherent  As such the nerve root was identified in the foramen and I dissected back towards the edge of the thecal sac on the left  With the thecal sac and nerve root retracted behind a suction retractor, a disc distractor was placed at L4-5 under lateral fluoroscopy after distracting this space open with in situ distractors the screw heads  This instrument was removed  An 8 mm disc knife was then used to distract the disc space open further  This instrument was then removed    There was some improvement in the disc space height, and under lateral fluoroscopic guidance with the thecal sac and nerve root retracted behind a suction retractor and Penfield 4, a 7 x 23 mm elevate cage packed with locally harvested autograft was placed under lateral fluoroscopy and partially expanded  There was significant improvement in disc space height and foraminotomy height  The introducer was removed and there was no obvious dural defect or CSF leak  The surgical site was irrigated copiously with antibiotic irrigation  A Ambler could be passed along both foramina without difficulty  The central canal was nicely decompressed with a small amount of ligamentum flavum allowed to [facility] on the left dura but was no longer compressive  The surgical site was irrigated with 50% dilute hydrogen peroxide followed by irrigation  Thirty-five milliimeter rods were placed bilaterally  Set screws were applied and there was adequate ellie above and below the construct bilaterally  Final AP and lateral fluoroscopy demonstrated good spinal alignment and placement of instrumentation  Electrophysiological monitoring remained stable  The set screws were finally tightened  The surgical site was irrigated copiously with antibiotic irrigation  The transverse processes and facet joints were decorticated with a matchstick drill  Locally harvested autograft and bone substitute were prepared in a bone mill and placed along the decorticated surface to promote bony fusion  A gram of vancomycin powder was placed in the epidural space  An epidural ERIKA drain was tunnelled to right of the incision  O Vicryl suture was used to approxiate the fascia and muscle while inverted 2-0 Vicryl was used to approximate the subcutaenous tissues  Stapler was used to approximate the skin edges  Miplex dressing was applied  The count was correct at the end of the case and there were no complications  The patient was carefully transferred onto the operating gurney and extubated The patient was transferred to the recovery room where they were noted to be hemodynamically stable and neurologically unchanged   The results of surgery were discussed with patient and family      I was present for the entire procedure    Patient Disposition:  PACU     SIGNATURE: Yoselin Mancini MD  DATE: December 10, 2018  TIME: 11:15 AM

## 2018-12-10 NOTE — ANESTHESIA PREPROCEDURE EVALUATION
Review of Systems/Medical History          Cardiovascular  Negative cardio ROS    Pulmonary       GI/Hepatic  Negative GI/hepatic ROS          Kidney stones,        Endo/Other  Negative endo/other ROS      GYN  Negative gynecology ROS          Hematology  Negative hematology ROS      Musculoskeletal  Negative musculoskeletal ROS        Neurology  Negative neurology ROS      Psychology   Negative psychology ROS              Physical Exam    Airway    Mallampati score: II  TM Distance: >3 FB  Neck ROM: full     Dental       Cardiovascular  Comment: Negative ROS,     Pulmonary      Other Findings        Anesthesia Plan  ASA Score- 2     Anesthesia Type- general with ASA Monitors  Additional Monitors:   Airway Plan: ETT  Comment: General anesthesia, endotracheal tube; standard ASA monitors  Risks and benefits discussed with patient; patient consented and agrees to proceed  I saw and evaluated the patient  If seen with CRNA, we have discussed the anesthetic plan and I am in agreement that the plan is appropriate for the patient        Plan Factors-    Induction- intravenous  Postoperative Plan- Plan for postoperative opioid use  Planned trial extubation    Informed Consent- Anesthetic plan and risks discussed with patient  I personally reviewed this patient with the CRNA  Discussed and agreed on the Anesthesia Plan with the CRNA  Jimmy Garrett

## 2018-12-10 NOTE — ANESTHESIA POSTPROCEDURE EVALUATION
Post-Op Assessment Note      CV Status:  Stable    Mental Status:  Alert and awake    Hydration Status:  Euvolemic    PONV Controlled:  Controlled    Airway Patency:  Patent    Post Op Vitals Reviewed: Yes          Staff: CRNA           /67 (12/10/18 1129)    Temp 98 2 °F (36 8 °C) (12/10/18 1129)    Pulse 86 (12/10/18 1129)   Resp 16 (12/10/18 1129)    SpO2 98 % (12/10/18 1129)

## 2018-12-10 NOTE — INTERVAL H&P NOTE
H&P reviewed  After examining the patient I find no changes in the patients condition since the H&P had been written  Patient personally seen and examined  Neurological examination unchanged compared to last office/progress note, with the following exceptions:    None  Patient continues to have a complete footdrop on the left and 2/5 power on right EHL and dorsiflexion on the right  Denies difficulties with bowel bladder function  He has stopped all antiplatelet/anticoagulation medication as directed  Post operative instructions and medications have been reviewed with the patient and family  Assessment and Plan:    All questions have been answered to the patient and family satisfaction  Plan to proceed with L4-5 posterior decompression with instrumented fixation fusion and possible transverse lumbar interbody fusion with possible extension to additional levels  They are in agreement with proceeding

## 2018-12-10 NOTE — PROGRESS NOTES
Called Lexy from neurosx regarding pts ERIKA drain output  Pt has put out approximately 300cc since he came up around 1430  Per Mily Machado, she will put in an H&H  [address] [facility] PCA notified of labwork to be drawn  Will continue to monitor pt

## 2018-12-11 LAB
ANION GAP SERPL CALCULATED.3IONS-SCNC: 6 MMOL/L (ref 4–13)
BUN SERPL-MCNC: 15 MG/DL (ref 5–25)
CALCIUM SERPL-MCNC: 8.4 MG/DL (ref 8.3–10.1)
CHLORIDE SERPL-SCNC: 106 MMOL/L (ref 100–108)
CO2 SERPL-SCNC: 29 MMOL/L (ref 21–32)
CREAT SERPL-MCNC: 0.67 MG/DL (ref 0.6–1.3)
ERYTHROCYTE [DISTWIDTH] IN BLOOD BY AUTOMATED COUNT: 12.7 % (ref 11.6–15.1)
GFR SERPL CREATININE-BSD FRML MDRD: 91 ML/MIN/1.73SQ M
GLUCOSE P FAST SERPL-MCNC: 118 MG/DL (ref 65–99)
GLUCOSE SERPL-MCNC: 118 MG/DL (ref 65–140)
HCT VFR BLD AUTO: 37.7 % (ref 36.5–49.3)
HGB BLD-MCNC: 12.8 G/DL (ref 12–17)
MCH RBC QN AUTO: 33.2 PG (ref 26.8–34.3)
MCHC RBC AUTO-ENTMCNC: 34 G/DL (ref 31.4–37.4)
MCV RBC AUTO: 98 FL (ref 82–98)
PLATELET # BLD AUTO: 197 THOUSANDS/UL (ref 149–390)
PMV BLD AUTO: 10.4 FL (ref 8.9–12.7)
POTASSIUM SERPL-SCNC: 4.4 MMOL/L (ref 3.5–5.3)
RBC # BLD AUTO: 3.86 MILLION/UL (ref 3.88–5.62)
SODIUM SERPL-SCNC: 141 MMOL/L (ref 136–145)
WBC # BLD AUTO: 12.31 THOUSAND/UL (ref 4.31–10.16)

## 2018-12-11 PROCEDURE — G8979 MOBILITY GOAL STATUS: HCPCS

## 2018-12-11 PROCEDURE — 99024 POSTOP FOLLOW-UP VISIT: CPT | Performed by: PHYSICIAN ASSISTANT

## 2018-12-11 PROCEDURE — 97163 PT EVAL HIGH COMPLEX 45 MIN: CPT

## 2018-12-11 PROCEDURE — 80048 BASIC METABOLIC PNL TOTAL CA: CPT | Performed by: NEUROLOGICAL SURGERY

## 2018-12-11 PROCEDURE — 85027 COMPLETE CBC AUTOMATED: CPT | Performed by: NEUROLOGICAL SURGERY

## 2018-12-11 PROCEDURE — G8978 MOBILITY CURRENT STATUS: HCPCS

## 2018-12-11 RX ADMIN — SENNOSIDES 8.6 MG: 8.6 TABLET, FILM COATED ORAL at 09:24

## 2018-12-11 RX ADMIN — CHOLECALCIFEROL TAB 10 MCG (400 UNIT) 400 UNITS: 10 TAB at 09:24

## 2018-12-11 RX ADMIN — METHOCARBAMOL 500 MG: 500 TABLET, FILM COATED ORAL at 05:27

## 2018-12-11 RX ADMIN — METHOCARBAMOL 500 MG: 500 TABLET, FILM COATED ORAL at 23:21

## 2018-12-11 RX ADMIN — GABAPENTIN 300 MG: 300 CAPSULE ORAL at 09:24

## 2018-12-11 RX ADMIN — DOCUSATE SODIUM 100 MG: 100 CAPSULE, LIQUID FILLED ORAL at 18:21

## 2018-12-11 RX ADMIN — METHOCARBAMOL 500 MG: 500 TABLET, FILM COATED ORAL at 00:49

## 2018-12-11 RX ADMIN — GABAPENTIN 300 MG: 300 CAPSULE ORAL at 18:21

## 2018-12-11 RX ADMIN — DOCUSATE SODIUM 100 MG: 100 CAPSULE, LIQUID FILLED ORAL at 09:24

## 2018-12-11 RX ADMIN — METHOCARBAMOL 500 MG: 500 TABLET, FILM COATED ORAL at 12:21

## 2018-12-11 RX ADMIN — ENOXAPARIN SODIUM 40 MG: 40 INJECTION SUBCUTANEOUS at 09:24

## 2018-12-11 RX ADMIN — ACETAMINOPHEN 975 MG: 325 TABLET, FILM COATED ORAL at 21:24

## 2018-12-11 RX ADMIN — METHOCARBAMOL 500 MG: 500 TABLET, FILM COATED ORAL at 18:21

## 2018-12-11 RX ADMIN — CEFAZOLIN SODIUM 2000 MG: 2 SOLUTION INTRAVENOUS at 00:49

## 2018-12-11 RX ADMIN — ACETAMINOPHEN 975 MG: 325 TABLET, FILM COATED ORAL at 05:27

## 2018-12-11 RX ADMIN — ACETAMINOPHEN 975 MG: 325 TABLET, FILM COATED ORAL at 14:22

## 2018-12-11 RX ADMIN — CYANOCOBALAMIN TAB 500 MCG 500 MCG: 500 TAB at 09:24

## 2018-12-11 NOTE — PROGRESS NOTES
Progress Note - Neurosurgery   Barillas Shadow 77 y o  male MRN: [id number]  Unit/Bed#: [facility] 923-01 Encounter: [id number]    Assessment:  76-year-old gentleman postop day 1 L4-5 TLIF  Doing well clinically with improvement of symptoms of neurogenic claudication  Bilateral footdrop remained stable  Voiding  Plan:  1  Continue to monitor neurological examination  Ambulate today with physical therapy  Expect DC home  Patient does have AFO splints which she will provide to help with ambulation  2  SCDs for DVT prophylaxis  Start enoxaparin today  3  Upright lumbar imaging is demonstrates improved alignment at L4-5 and good positioning of instrumentation  4  Will transition ERIKA drain to thumb print suction  Hemoglobin is stable  5  Adequate pain control on current pain medication regimen  VTE Prophylaxis: Sequential compression device (Venodyne)  and Enoxaparin (Lovenox)    Subjective/Objective   Chief Complaint:  None  Reports improvement in tingling in feet  Vitals: Blood pressure 120/57, pulse 87, temperature 97 5 °F (36 4 °C), temperature source Oral, resp  rate 18, height 5' 9" (1 753 m), weight 88 5 kg (195 lb), SpO2 97 %  ,Body mass index is 28 8 kg/m²  Hemodynamic Monitoring: None  Physical Exam:     Physical Exam   Constitutional: He is oriented to person, place, and time  He appears well-developed and well-nourished  No distress  Neurological: He is alert and oriented to person, place, and time  No sensory deficit  Full power in lower extremities aside from baseline 0/5 power in left dorsiflexion EHL and 1-2/5 power on right dorsiflexion and EHL  Skin: Skin is warm and dry  Lumbar dressing reinforced with outer dressing appearing dry  Psychiatric: He has a normal mood and affect  His behavior is normal    Vitals reviewed  Neurologic Exam     Mental Status   Oriented to person, place, and time             Invasive Devices     Peripheral Intravenous Line Peripheral IV 12/10/18 Right Hand less than 1 day    Peripheral IV 12/10/18 Right Hand less than 1 day          Drain            Closed/Suction Drain Midline Back Bulb 7 Fr  less than 1 day                          Lab Results:   I have personally reviewed pertinent results  Lab Results   Component Value Date    WBC 12 31 (H) 12/11/2018    HGB 12 8 12/11/2018    HCT 37 7 12/11/2018    MCV 98 12/11/2018     12/11/2018    MCH 33 2 12/11/2018    MCHC 34 0 12/11/2018    RDW 12 7 12/11/2018    MPV 10 4 12/11/2018    SODIUM 141 12/11/2018     12/11/2018    CO2 29 12/11/2018    BUN 15 12/11/2018    CREATININE 0 67 12/11/2018    CALCIUM 8 4 12/11/2018    EGFR 91 12/11/2018       Imaging Studies: I have personally reviewed pertinent films in PACS  Upright plain film lumbar spine dated December 10th, 2018  Comparison to preoperative imaging  Interval L4-5 decompression and fusion with interbody graft  Improvement in disc height  No evidence of hardware loosening or failure  Other Studies:  None

## 2018-12-11 NOTE — UTILIZATION REVIEW
Initial Clinical Review    Age/Sex: 77 y o  male admitted on 12/10 for elective surgery - OR    Surgery Date: 12/10    Procedure: S/P L4-5 posterior instrumented fixation with Medtronic Solara system and placement of elevate interbody graft with locally harvested autograft for fusion and posterior decompression  Anesthesia: General    Admission Orders: Date/Time/Statement: Outpatient No Charge Bed 12/10 and changed to Observation 12/11 @ 1404  Pt with increase drain outpt and requiring continue stay  Vital Signs: /82 (BP Location: Left arm)   Pulse 98   Temp 98 °F (36 7 °C) (Oral)   Resp 18   Ht 5' 9" (1 753 m)   Wt 88 5 kg (195 lb)   SpO2 96%   BMI 28 80 kg/m²     Diet:        Diet Orders            Start     Ordered    12/11/18 6799  Diet Surgical; Surgical Soft/Lite Meal  Diet effective now     Question Answer Comment   Diet Type Surgical    Surgical Surgical Soft/Lite Meal    RD to adjust diet per protocol? Yes        12/11/18 0829          Mobility: OOB  PT eval and treat    DVT Prophylaxis: Sequential compression device    Scheduled Meds:  Current Facility-Administered Medications:  Cefazolin  Intravenous x3   acetaminophen 975 mg Oral Q8H [facility]   cholecalciferol 400 Units Oral Daily   vitamin B-12 500 mcg Oral Daily   docusate sodium 100 mg Oral BID   enoxaparin 40 mg Subcutaneous Daily   gabapentin 300 mg Oral BID   senna 1 tablet Oral Daily     Continuous Infusions:  lactated ringers 100 mL/hr Last Rate: Stopped (12/10/18 1346)     PRN Meds: ondansetron    oxyCODONE po x1    ------------------------------------------------------------------------------------------------------------------------    12/11 Progress notes:  Continue to monitor neurological examination  Ambulate today with physical therapy  Expect DC home  Patient does have AFO splints which she will provide to help with ambulation  SCDs for DVT prophylaxis  Start enoxaparin today    Upright lumbar imaging is demonstrates improved alignment at L4-5 and good positioning of instrumentation  Will transition ERIKA drain to thumb print suction  12/11 Per Neurosurgery:  Pt had drain out of >600 ml and will be staying 2nd MN for monitoring  Neurosurgery will continue to follow his primary team   Due to high drain output over a 24 hr period, it is recommended that drain remain in and continue to be observed  Patient should remain in hospital until drain output is less than 100 cc in 24 hr or less than 30 cc and 8 hr      12/10 Per Nursing notes:   Pt has put out approximately 300cc since he came up around 1430  Monitor H& H per neurosurgery        12/10/18 1912    Hemoglobin 12 0 - 17 0 g/dL 13 1    Hematocrit 36 5 - 49 3 % 38 4      12/11/18 0529     WBC 4 31 - 10 16 Thousand/uL 12 31     RBC 3 88 - 5 62 Million/uL 3 86     Hemoglobin 12 0 - 17 0 g/dL 12 8    Hematocrit 36 5 - 49 3 % 37 7

## 2018-12-11 NOTE — PHYSICIAN ADVISOR
Current patient class: Outpatient Procedure  The patient is currently on Hospital Day: 2 at [address] [place] [address]        The patient was admitted to the hospital  on N/A at N/A for the following diagnosis:  Spondylolisthesis of lumbar region [M43 16]  Lumbar stenosis with neurogenic claudication [M48 062]  Foot drop, bilateral [M21 371, M21 372]     After review of the relevant documentation, labs, vital signs and test results, the patient is most appropriate for OBSERVATION STATUS  Rationale is as follows: The patient is a 54-year-old male who presented to the hospital on December 10, 2018 for L4-L5 posterior instrumented fixation with [employer] a large system and placement of interbody graft with locally harvested autograft for fusion and posterior decompression  The patient was evaluated today for possible discharge, however he had 585 cc output in 24 hr  The drain will remain in place until there is output of less than 100 cc in 24 hr   According to today's note, the patient will continued to be observed in the hospital given this output  Intravenous Dilaudid has been discontinued  He is receiving oral pain medication  The patient has been ambulating in the hallway and "feels great"  Other than the drain output, there are no other postoperative complications or concerns  The patient can be changed to observation level care, due to continued need for hospital observation as noted above  If the patient is not stable for discharge tomorrow, the case will be reviewed possible inpatient status      The patients vitals on arrival were ED Triage Vitals   Temperature Pulse Respirations Blood Pressure SpO2   12/10/18 0558 12/10/18 0558 12/10/18 0558 12/10/18 0558 12/10/18 0558   97 8 °F (36 6 °C) 70 18 146/83 96 %      Temp Source Heart Rate Source Patient Position - Orthostatic VS BP Location FiO2 (%)   12/10/18 0558 12/10/18 0558 12/10/18 1330 12/10/18 1330 --   Tymp Core Monitor Lying Right arm       Pain Score       12/10/18 1124       2           Past Medical History:   Diagnosis Date    Diphtheria     History of chicken pox     Kidney stones     Lumbar stenosis     Measles      Past Surgical History:   Procedure Laterality Date    KIDNEY STONE SURGERY      [place] ARTHRODESIS POSTERIOR/POSTEROLATERAL LUMBAR Bilateral 12/10/2018    Procedure: L4-5 posterior decompression with instrumented fixation fusion; transforaminal lumbar interbody fusion;  Surgeon: Gavin Cloud MD;  Location: [facility];  Service: Neurosurgery           Consults have been placed to:   IP CONSULT TO CASE MANAGEMENT    Vitals:    12/10/18 2346 12/11/18 0300 12/11/18 0700 12/11/18 1100   BP: 147/84 120/57 123/82 125/80   BP Location: Left arm Left arm Left arm Left arm   Pulse: 85 87 98 86   Resp: 18 18 18 18   Temp: 98 7 °F (37 1 °C) 97 5 °F (36 4 °C) 98 °F (36 7 °C) 98 °F (36 7 °C)   TempSrc: Oral Oral Oral Oral   SpO2: 98% 97% 96% 97%   Weight:       Height:           Most recent labs:    Recent Labs      12/11/18   0529   WBC  12 31*   HGB  12 8   HCT  37 7   PLT  197   K  4 4   CALCIUM  8 4   BUN  15   CREATININE  0 67       Scheduled Meds:  Current Facility-Administered Medications:  acetaminophen 975 mg Oral Q8H [address] [facility], [place]   cholecalciferol 400 Units Oral Daily Gavin Cloud MD   vitamin B-12 500 mcg Oral Daily Gavin Cloud MD   docusate sodium 100 mg Oral BID Gavin Cloud MD   enoxaparin 40 mg Subcutaneous Daily Gavin Cloud MD   gabapentin 300 mg Oral BID Gavin Cloud MD   methocarbamol 500 mg Oral Q6H [address] Gavin Cloud MD   ondansetron 4 mg Intravenous Q6H PRN Gavin Cloud MD   oxyCODONE 10 mg Oral Q4H PRN Gvain Cloud MD   oxyCODONE 5 mg Oral Q4H PRN Gavin Cloud MD   senna 1 tablet Oral Daily Gavin Cloud MD     Continuous Infusions:   PRN Meds: ondansetron    oxyCODONE    oxyCODONE    Surgical procedures (if appropriate):  Procedure(s):  L4-5 posterior decompression with instrumented fixation fusion; transforaminal lumbar interbody fusion

## 2018-12-11 NOTE — SOCIAL WORK
MSW Student met with patient and explained her role  Pt was alert and oriented and reported that his emergency contact is his wife Aaron Trejo [phone]  Pt reported that he lives with his wife in a 3 story home with 8 steps to enter from the front and 1 step from the back entrance  Pt reported that the bedroom is upstairs and there are 16 steps  Pt denied any hx of MH stays, D/A, HHC, or inpatient PT/OT  Pt has had outpatient PT through   Pt was IPTA with ADLS  Pt uses cane to ambulate  Pts PCP is Dr Aylin Villagran private practice and his pharmacy of choice is [facility]  CM reviewed d/c planning process including the following: identifying help at home, patient preference for d/c planning needs, Discharge Lounge, [facility] Meds to Bed program, availability of treatment team to discuss questions or concerns patient and/or family may have regarding understanding medications and recognizing signs and symptoms once discharged  CM also encouraged patient to follow up with all recommended appointments after discharge  Patient advised of importance for patient and family to participate in managing patients medical well being      I have read and agree with the above documentation    LENA Campbell

## 2018-12-11 NOTE — SOCIAL WORK
CM discussed pt at care coordination rounds  Pt is not medically cleared for discharge  CM will continue to follow for any discharge planning needs

## 2018-12-11 NOTE — SOCIAL WORK
Cm informed by PT that patient can be discharged home with outpatient services  No assistive device identified at this time  Cm awaiting medical stability for discharge

## 2018-12-11 NOTE — PROGRESS NOTES
Progress Note - Neurosurgery   Graham Gray 77 y o  male MRN: [id number]  Unit/Bed#: [facility] 923-01 Encounter: [id number]    Assessment:  1  Postop day 1 L4-5 posterior instrumented fixation with Medtronic Solara system and placement of elevate interbody graft with locally harvested autograft for fusion and posterior decompression  2  Foot drop bilaterally  3  Lumbar stenosis with neurogenic claudication    Plan:  · Exam:  Alert and oriented x3, moving all extremities with weakness in bilateral feet left worse than right, reflexes 2+ and symmetrical, no Cristopher or clonus noted, no drift, dressing over incision is clean dry and intact, drain with high bloody output  · Imaging reviewed personally and by attending  Results are as follows:  · X-ray lumbar spine 12/10/2018:  Official read pending, alignment looks stable and hardware is intact  · X-ray lumbar 10/29/2018:  Grade 2 anterolisthesis L4-5 without evidence of dynamic instability  · ERIKA drain in place - output 585 cc in 24 hr, will remain in until output is less than 100 cc in 24 hr or less than 30 cc in 8 hr  · Pain control - patient is well tolerated on current pain regimen  · Continue Tylenol 975 mg Q 8 hr scheduled  · Continue oxycodone 5 mg q 4 hours as needed for moderate pain  · Continue oxycodone 10 mg q 4 hours as needed for severe pain  · Discontinue lactated Ringer's and peripheral IV line  · Dressing change on postop day 2 or if dressing is saturated more than 50% with 4 x 4 and Tegaderm  · Mobilize as tolerated with assistance  · PT/OT  · DVT PPX:  SCDs, enoxaparin  · Neurosurgery will continue to follow his primary team   Due to high drain output over a 24 hr period, it is recommended that drain remain in and continue to be observed  Patient should remain in hospital until drain output is less than 100 cc in 24 hr or less than 30 cc and 8 hr  Please call with questions or concerns      Subjective/Objective   Chief Complaint: "I feel great " / postop day 1 L4-5 posterior instrumented fixation with [id number] Solara system and placement of elevate interbody graft with locally harvested autograft for fusion and posterior decompression    Subjective:  Patient reports 3/10 incisional pain  He is doing well on current pain regimen and is okay with taking Tylenol  He reports improvements postoperatively most notably with walking down the chow which felt "great"  No change in bilateral footdrop but states wearing foot braces help  Patient is urinating per baseline  He has not had a bowel movement yet but is passing gas  He denies headache, dizziness, chest pain, shortness of breath, nausea, vomiting, abdominal pain, new numbness, tingling or weakness  Nurse rounding completed with EMELY WILSON [facility] - no overnight events  Objective:  Sitting in chair, NAD  I/O       12/09 0701 - 12/10 0700 12/10 0701 - 12/11 0700 12/11 0701 - 12/12 0700    P  O   600 360    I V  (mL/kg)  1400 (15 8)     IV Piggyback  550     Total Intake(mL/kg)  2550 (28 8) 360 (4 1)    Urine (mL/kg/hr)  2550 (1 2) 575 (2)    Drains  585 85    Blood  100     Total Output   3235 660    Net   -685 -300                 Invasive Devices     Peripheral Intravenous Line            Peripheral IV 12/10/18 Right Hand 1 day    Peripheral IV 12/10/18 Right Hand 1 day          Drain            Closed/Suction Drain Midline Back Bulb 7 Fr  less than 1 day                Physical Exam:  Vitals: Blood pressure 123/82, pulse 98, temperature 98 °F (36 7 °C), temperature source Oral, resp  rate 18, height 5' 9" (1 753 m), weight 88 5 kg (195 lb), SpO2 96 %  ,Body mass index is 28 8 kg/m²      General appearance: alert, appears stated age, cooperative and no distress  Head: Normocephalic, without obvious abnormality, atraumatic  Eyes: EOMI, PERRL  Neck: supple, symmetrical, trachea midline and NT  Back: no kyphosis present, no tenderness to percussion or palpation, dressing over incision clean dry intact, drain with high bloody output set to bulb only  Lungs: non labored breathing  Heart: regular heart rate  Neurologic:   Mental status: Alert, oriented x3, follows commands, able to do simple calculations, thought content appropriate  Cranial nerves: grossly intact (Cranial nerves II-XII)  Sensory: normal to light touch, decreased sensation to pinprick along left lateral calf and dorsal aspect of left foot, JPS 3/3, DST intact  Motor: moving all extremities with weakness in bilateral feet, strength 5/5 except as noted:   RLE:  1-2/5 DF   LLE:  0/5 DF  Reflexes: 2+ and symmetric, no Cristopher or clonus noted  Coordination: finger to nose normal bilaterally, no drift bilaterally    Lab Results:    Results from last 7 days  Lab Units 12/11/18  0529 12/10/18  1912   WBC Thousand/uL 12 31*  --    HEMOGLOBIN g/dL 12 8 13 1   HEMATOCRIT % 37 7 38 4   PLATELETS Thousands/uL 197 205       Results from last 7 days  Lab Units 12/11/18  0529   POTASSIUM mmol/L 4 4   CHLORIDE mmol/L 106   CO2 mmol/L 29   BUN mg/dL 15   CREATININE mg/dL 0 67   CALCIUM mg/dL 8 4                 No results found for: TROPONINT  ABG:No results found for: PHART, RQJ6QOJ, PO2ART, PFO0OLK, R0BMRLME, BEART, SOURCE    Imaging Studies: I have personally reviewed pertinent reports  and I have personally reviewed pertinent films in PACS    Xr Spine Lumbar 2 Or 3 Views Injury    Result Date: 12/10/2018  Impression: Fluoroscopic guidance provided for surgical procedure  Please refer to the separate procedure notes for additional details  Workstation performed: [id number]     EKG, Pathology, and Other Studies: I have personally reviewed pertinent reports        VTE Pharmacologic Prophylaxis: Enoxaparin (Lovenox)    VTE Mechanical Prophylaxis: sequential compression device

## 2018-12-11 NOTE — PHYSICAL THERAPY NOTE
Physical Therapy Initial Evaluation   Franco Ortega, PT   12/11/18 7759   Note Type   Note type Eval only   Pain Assessment   Pain Assessment 0-10   Pain Score 3   Pain Type Acute pain;Surgical pain   Pain Location Back   Hospital Pain Intervention(s) Repositioned; Ambulation/increased activity; Emotional support   Response to Interventions tolerated  Home Living   Type of [address] Multi-level;Bed/bath upstairs;Stairs to enter with rails  (8 BRAVO with 1 railing  Wife home full time and can assist pt )   Home Equipment Cane   Additional Comments Used SPC PTA  Has his own prefabricated dorsiflexion assist brace for L ankle  Prior Function   Level of [place] Independent with ADLs and functional mobility   Lives With Spouse   Receives Help From Family   ADL Assistance Independent   IADLs Needs assistance   Falls in the last 6 months 1 to 4   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  Restrictions/Precautions   Weight Bearing Precautions Per Order No   Braces or Orthoses Other (Comment)  (has own prefab dorsiflexion assist brace for L ankle  )   General   Additional Pertinent History dx: adm with spondylolisthesis of lumbar region  POD 1 for elective L4-5 TLIF  PMH: bilat foot drop, lumbar spinal stenosis with claudication  Family/Caregiver Present Yes  (wife  )   Cognition   Overall Cognitive Status WFL   Arousal/Participation Alert   Orientation Level Oriented X4   Memory Within functional limits   Following Commands Follows all commands and directions without difficulty   Comments Pt a bit overanxious to be mobile  Slightly impulsive  RLE Assessment   RLE Assessment (hip/knee 4+/5, dorsiflexion 3-/5, plantarflexion 3+/5  )   LLE Assessment   LLE Assessment (Hip and knee 4-/5, dorsiflexion 2/5, plantarflexion 3-/5  )   Coordination   Movements are Fluid and Coordinated 0   Coordination and Movement Description gait abnormalities due to ankle weakness      Sensation (numbness top of bilat feet   )   Bed Mobility   Rolling R (Pt seated in chair upon PT entry into room  )   Additional Comments Pt returned to sitting in recliner upon completion of PT eval      Transfers   Sit to Stand 6  Modified independent   Additional items Increased time required   Stand to Sit 6  Modified independent   Additional items Increased time required   Stand pivot 6  Modified independent   Additional items Increased time required   Additional Comments SPC used  Ambulation/Elevation   Gait pattern Improper Weight shift;Decreased foot clearance;Narrow MARISEL; Excessively slow; Short stride  (Increased hip hiking noted bilat  to clear feet  )   Gait Assistance 5  Supervision   Additional items Assist x 1;Verbal cues   Assistive Device Straight cane   Distance 80'   Stair Management Assistance 4  Minimal assist   Additional items Assist x 1;Verbal cues; Increased time required  (1 LOB noted requiring min assist from PT to avoid a fall   )   Stair Management Technique One rail R;Nonreciprocal;Reciprocal;With cane  (Started out reciprocally but had LOB  Then non-reciprocal   )   Number of Stairs 14  (Pt initially a bit impulsive but did change to non-reciproca)   Balance   Static Sitting Fair +   Dynamic Sitting Fair   Static Standing Fair   Dynamic Standing Fair   Ambulatory Fair -   Endurance Deficit   Endurance Deficit Yes   Endurance Deficit Description fatigue, pain  Activity Tolerance   Activity Tolerance Patient tolerated treatment well;Patient limited by fatigue;Patient limited by pain   Medical Staff Made Aware RN consented to allow pt to participate in PT eval      Nurse Made Aware yes   Assessment   Prognosis Good   Problem List Decreased strength;Decreased range of motion;Decreased endurance; Impaired balance;Decreased mobility; Decreased coordination;Decreased safety awareness;Decreased skin integrity;Pain   Assessment Pt is 77 y o  male seen for PT evaluation s/p admit to [facility] on 12/10/2018 w/ Spondylolisthesis of lumbar region  Pt is POD #1 for L4-5 elective TLIF  PT consulted to assess pt's functional mobility and d/c needs  Order placed for PT eval and tx, w/ out of bed order  Comorbidities affecting pt's physical performance at time of assessment include: hx of bilateral foot drop, lumbar spinal stenosis with claudication  PTA, pt was independent w/ all functional mobility w/ a cane, ambulates community distances and elevations, has 8 BRAVO, lives w/ his wife who is home full time and can assist pt as needed in 3 level home (with bedroom on 2nd floor) and works full time  Personal factors affecting pt at time of IE include: lives in 3 story house, ambulating w/ assistive device, stairs to enter home, positive fall history, inability to perform IADLs and is currently functioning at a below baseline level of mobility  Please find objective findings from PT assessment regarding body systems outlined above with impairments and limitations including weakness, decreased ROM, impaired balance, decreased endurance, gait deviations, pain, decreased functional mobility tolerance, altered sensation, decreased safety awareness, fall risk and decreased skin integrity  The following objective measures performed on IE also reveal limitations: Barthel Index: 85/100  Pt's clinical presentation is currently unstable/unpredictable seen in pt's presentation of c/o post -surgical back pain, presenting with below baseline level of mobility with slight impulsivity and 1 loss of balance noted during ambulation on the stairs, pt with some abnormal lab values, pt with the need for telemetry, pt with continued bilateral foot drop affecting balance and gait  Pt to benefit from continued PT tx to address deficits as defined above and maximize level of functional independent mobility and consistency   From PT/mobility standpoint, recommendation at time of d/c would be OP PT and home with family assist for mobility activities pending progress in order to facilitate return to PLOF  Barriers to Discharge None   Goals   Patient Goals To go home and get stronger  STG Expiration Date 12/21/18   Short Term Goal #1 1  Independent bed mobility activities while adhering to spine precautions  2  Indep transfers and ambulation 300' with SPC , no LOB, no impulsivity  3  Close supervision to ascend/descend 12 steps with 1 rail with SPC , no LOB, no impulsivity  4, Independent completion of home ex program for ankle strengthening/stretching bilat   Treatment Day 0   Plan   Treatment/Interventions Functional transfer training;LE strengthening/ROM; Patient/family training;Equipment eval/education; Bed mobility;Gait training;Spoke to nursing;Spoke to case management   PT Frequency Other (Comment)  (5-7x/wk)   Recommendation   Recommendation Outpatient PT; Home with family support   Equipment Recommended Cane   PT - OK to Discharge Yes   Barthel Index   Feeding 10   Bathing 0   Grooming Score 5   Dressing Score 10   Bladder Score 10   Bowels Score 10   Toilet Use Score 10   Transfers (Bed/Chair) Score 15   Mobility (Level Surface) Score 10   Stairs Score 5   Barthel Index Score 85

## 2018-12-11 NOTE — PLAN OF CARE
Problem: PHYSICAL THERAPY ADULT  Goal: Performs mobility at highest level of function for planned discharge setting  See evaluation for individualized goals  Treatment/Interventions: Functional transfer training, LE strengthening/ROM, Patient/family training, Equipment eval/education, Bed mobility, Gait training, Spoke to nursing, Spoke to case management  Equipment Recommended: [place]       See flowsheet documentation for full assessment, interventions and recommendations  Prognosis: Good  Problem List: Decreased strength, Decreased range of motion, Decreased endurance, Impaired balance, Decreased mobility, Decreased coordination, Decreased safety awareness, Decreased skin integrity, Pain  Assessment: Pt is 77 y o  male seen for PT evaluation s/p admit to [facility] on 12/10/2018 w/ Spondylolisthesis of lumbar region  Pt is POD #1 for L4-5 elective TLIF  PT consulted to assess pt's functional mobility and d/c needs  Order placed for PT eval and tx, w/ out of bed order  Comorbidities affecting pt's physical performance at time of assessment include: hx of bilateral foot drop, lumbar spinal stenosis with claudication  PTA, pt was independent w/ all functional mobility w/ a cane, ambulates community distances and elevations, has 8 BRAVO, lives w/ his wife who is home full time and can assist pt as needed in 3 level home (with bedroom on 2nd floor) and works full time  Personal factors affecting pt at time of IE include: lives in 3 story house, ambulating w/ assistive device, stairs to enter home, positive fall history, inability to perform IADLs and is currently functioning at a below baseline level of mobility   Please find objective findings from PT assessment regarding body systems outlined above with impairments and limitations including weakness, decreased ROM, impaired balance, decreased endurance, gait deviations, pain, decreased functional mobility tolerance, altered sensation, decreased safety awareness, fall risk and decreased skin integrity  The following objective measures performed on IE also reveal limitations: Barthel Index: 85/100  Pt's clinical presentation is currently unstable/unpredictable seen in pt's presentation of c/o post -surgical back pain, presenting with below baseline level of mobility with slight impulsivity and 1 loss of balance noted during ambulation on the stairs, pt with some abnormal lab values, pt with the need for telemetry, pt with continued bilateral foot drop affecting balance and gait  Pt to benefit from continued PT tx to address deficits as defined above and maximize level of functional independent mobility and consistency  From PT/mobility standpoint, recommendation at time of d/c would be OP PT and home with family assist for mobility activities pending progress in order to facilitate return to PLOF  Barriers to Discharge: None     Recommendation: Outpatient PT, Home with family support     PT - OK to Discharge: Yes    See flowsheet documentation for full assessment

## 2018-12-12 VITALS
WEIGHT: 195 LBS | DIASTOLIC BLOOD PRESSURE: 62 MMHG | HEIGHT: 69 IN | SYSTOLIC BLOOD PRESSURE: 111 MMHG | TEMPERATURE: 97.7 F | OXYGEN SATURATION: 97 % | RESPIRATION RATE: 18 BRPM | BODY MASS INDEX: 28.88 KG/M2 | HEART RATE: 83 BPM

## 2018-12-12 PROBLEM — M43.16 SPONDYLOLISTHESIS OF LUMBAR REGION: Status: RESOLVED | Noted: 2018-10-29 | Resolved: 2018-12-12

## 2018-12-12 PROCEDURE — 99024 POSTOP FOLLOW-UP VISIT: CPT | Performed by: PHYSICIAN ASSISTANT

## 2018-12-12 RX ORDER — METHOCARBAMOL 500 MG/1
500 TABLET, FILM COATED ORAL EVERY 6 HOURS PRN
Qty: 60 TABLET | Refills: 0 | Status: SHIPPED | OUTPATIENT
Start: 2018-12-12 | End: 2019-01-22

## 2018-12-12 RX ORDER — DOCUSATE SODIUM 100 MG/1
100 CAPSULE, LIQUID FILLED ORAL 2 TIMES DAILY
Qty: 10 CAPSULE | Refills: 0 | Status: SHIPPED | OUTPATIENT
Start: 2018-12-12 | End: 2019-01-22

## 2018-12-12 RX ORDER — OXYCODONE HYDROCHLORIDE 5 MG/1
TABLET ORAL
Qty: 10 TABLET | Refills: 0 | Status: SHIPPED | OUTPATIENT
Start: 2018-12-12 | End: 2019-01-22

## 2018-12-12 RX ORDER — GABAPENTIN 300 MG/1
300 CAPSULE ORAL 2 TIMES DAILY
Qty: 28 CAPSULE | Refills: 0 | Status: SHIPPED | OUTPATIENT
Start: 2018-12-12 | End: 2019-01-22

## 2018-12-12 RX ADMIN — DOCUSATE SODIUM 100 MG: 100 CAPSULE, LIQUID FILLED ORAL at 08:08

## 2018-12-12 RX ADMIN — CHOLECALCIFEROL TAB 10 MCG (400 UNIT) 400 UNITS: 10 TAB at 08:08

## 2018-12-12 RX ADMIN — ENOXAPARIN SODIUM 40 MG: 40 INJECTION SUBCUTANEOUS at 08:08

## 2018-12-12 RX ADMIN — METHOCARBAMOL 500 MG: 500 TABLET, FILM COATED ORAL at 11:46

## 2018-12-12 RX ADMIN — GABAPENTIN 300 MG: 300 CAPSULE ORAL at 08:08

## 2018-12-12 RX ADMIN — CYANOCOBALAMIN TAB 500 MCG 500 MCG: 500 TAB at 08:08

## 2018-12-12 RX ADMIN — METHOCARBAMOL 500 MG: 500 TABLET, FILM COATED ORAL at 05:50

## 2018-12-12 RX ADMIN — SENNOSIDES 8.6 MG: 8.6 TABLET, FILM COATED ORAL at 08:08

## 2018-12-12 RX ADMIN — ACETAMINOPHEN 975 MG: 325 TABLET, FILM COATED ORAL at 05:50

## 2018-12-12 NOTE — NURSING NOTE
Reviewed discharge instructions with patient re: medications, diet, activity limits, incisional care and follow up appointments to be made  Patient indicates understanding of same

## 2018-12-12 NOTE — SOCIAL WORK
Cm reviewed patient during care coordination rounds  Patient is medically stable for discharge home today  Cm confirm that the patient need outpatient PT/OT at time of discharge  CM informed medical scene about need for script  Cm met with patient at bedside to discuss discharge plan  Patient in agreement with discharge home with outpatient services  [facility]  Patient's wife to transport patient home  All parties in agreement with discharge plan  CM reviewed d/c planning process including the following: identifying help at home, patient preference for d/c planning needs, Discharge Lounge, [facility] Meds to Bed program, availability of treatment team to discuss questions or concerns patient and/or family may have regarding understanding medications and recognizing signs and symptoms once discharged  CM also encouraged patient to follow up with all recommended appointments after discharge  Patient advised of importance for patient and family to participate in managing patients medical well being

## 2018-12-12 NOTE — DISCHARGE SUMMARY
Discharge Summary - Neurosurgery   Lambert Diaz 77 y o  male MRN: [id number]  [facility] Encounter: [id number]    Admission Date:   Admission Orders     Ordered        12/11/18 1406  Place in Observation  Once         12/11/18 1406  Place in Observation  Once                Discharge Date: 12/12/18    Admitting Diagnosis: Spondylolisthesis of lumbar region [M43 16]  Lumbar stenosis with neurogenic claudication [M48 062]  Foot drop, bilateral [M21 371, M21 372]    Discharge Diagnosis:   1  s/p L4-5 posterior instrumented fixation with [employer]tr[employer] Solara system and placement of elevate interbody graft with locally harvested autograft for fusion and posterior decompression  2  Foot drop bilaterally  3  Lumbar stenosis with neurogenic claudication    Resolved Problems  Date Reviewed: 12/9/2018          Resolved    * (Principal)Spondylolisthesis of lumbar region 12/12/2018     Resolved by  Lois De Guzman PA-C          Attending: Dr Bebe Lujan Physician(s): none    Procedures Performed: L4-5 posterior instrumented fixation with [employer] Solara system and placement of elevate interbody graft with locally harvested autograft for fusion and posterior decompression  Hospital Course: This is a 44-year-old gentleman with progressive lumbar neurogenic claudication secondary to L4-5 anterolisthesis and stenosis  MRI imaging reconfirm severe stenosis at L4-L5 wall flexion-extension films demonstrate mild amount motion at his grade 1-2 anterolisthesis  For these reasons he was found to be an appropriate candidate for decompression fixation fusion  For this reason patient presented to the hospital for the above-noted procedure  Surgery was without complications  Operative findings included severe canal bilateral recess stenosis at L4-5 secondary to facet and ligamentous hypertrophy  Ligamentum flavum on the left was densely adherent to the dura    Once medically stabilized she was transferred to medical-surgical floor  There were no significant events overnight  The following day patient noted improvement in his neurogenic symptoms and was voiding well  His bilateral foot drops remained stable  Patient was started on DVT prophylaxis to include bilateral sequential compression devices along with enoxaparin  Upright imaging were completed showing good position of instrumentation and improved alignment  Drain was with high output and maintained  On the day of discharge drain output had decreased significantly  Patient's pain was well controlled  Exam is at baseline and there were no new radicular complaints  Patient was cleared for therapy for discharge home  Discharge instructions reviewed with the patient and his family  All questions were answered  Patient will follow-up as scheduled for two week and six week postoperative visit  Condition at Discharge: good     Discharge instructions/Information to patient and family:   See after visit summary for information provided to patient and family  Provisions for Follow-Up Care:  See after visit summary for information related to follow-up care and any pertinent home health orders  Disposition: Home    Planned Readmission: No    Discharge Statement   I spent 25 minutes discharging the patient  This time was spent on the day of discharge  I had direct contact with the patient on the day of discharge  Additional documentation is required if more than 30 minutes were spent on discharge  Discharge Medications:  See after visit summary for reconciled discharge medications provided to patient and family

## 2018-12-12 NOTE — PROGRESS NOTES
Progress Note - Neurosurgery   Sendy Wade 77 y o  male MRN: [id number]  Unit/Bed#: [facility] 923-01 Encounter: [id number]    Assessment:  1  Postop day 2 L4-5 posterior instrumented fixation with [employer] Solara system and placement of elevate interbody graft with locally harvested autograft for fusion and posterior decompression  2  Foot drop bilaterally  3  Lumbar stenosis with neurogenic claudication    Plan:  · Exam:  Alert and oriented x3, moving all extremities with weakness in bilateral feet left worse than right  · Incision clean dry intact with staples without active drainage with applied pressure  · Imaging reviewed personally and by attending  Results are as follows:  · X-ray lumbar spine 12/10/2018:  Official read pending, alignment looks stable and hardware is intact  · X-ray lumbar 10/29/2018:  Grade 2 anterolisthesis L4-5 without evidence of dynamic instability  · ERIKA drain in place - output <30ml over two consecutive 8 hour shifts  Drain discontinued without complications  Suture tied  Patient tolerated well  · Pain control - patient is well tolerated on current pain regimen  Continue current regimen  · Mobilize as tolerated with assistance  PT/OT - cleared for home with outpatient services  · DVT PPX:  SCDs, enoxaparin  · Patient medically cleared for discharge  Discharge instructions the with the patient and his family in the room  All questions were answered  Follow-up as scheduled    Subjective/Objective   Chief Complaint: "I feel good"/postoperative follow-up    Subjective:  Patient states pain well controlled on Tylenol in scheduled Robaxin and gabapentin  Denies any new radicular pain, numbness, tingling and or weakness of extremities  Admits to voiding well  No bowel movement  + flatus  No abdominal pain  No chest pain, shortness of breath, nausea or vomiting  Objective:  Sitting up in chair  NAD      I/O       12/10 0701 - 12/11 0700 12/11 0701 - 12/12 0700 12/12 0701 - 12/13 0700    P  O  600 1302 480    I V  (mL/kg) 1400 (15 8)      IV Piggyback 550      Total Intake(mL/kg) 2550 (28 8) 1302 (14 7) 480 (5 4)    Urine (mL/kg/hr) 2550 (1 2) 1875 (0 9)     Drains 585 145     Blood 100      Total Output 3235 2020      Net -685 -718 +480           Unmeasured Urine Occurrence  4 x     Unmeasured Stool Occurrence  0 x           Invasive Devices     Drain            Closed/Suction Drain Midline Back Bulb 7 Fr  2 days                Physical Exam:  Vitals: Blood pressure 111/62, pulse 83, temperature 97 7 °F (36 5 °C), temperature source Oral, resp  rate 18, height 5' 9" (1 753 m), weight 88 5 kg (195 lb), SpO2 97 %  ,Body mass index is 28 8 kg/m²  General appearance: alert, appears stated age, cooperative and no distress  Head: Normocephalic, without obvious abnormality, atraumatic  Eyes:  Conjugate gaze, tracks appropriately room  Neck: supple, symmetrical, trachea midline   Back:  Incision clean dry intact with staples  Skin edges well approximated  No active drainage with applied pressure  Lungs: non labored breathing  Heart: regular heart rate  Neurologic:   Mental status: Alert, oriented, thought content appropriate  Sensory: normal to LT except dorsum of feet  Motor: moving all extremities with baseline b/l foot drop    Lab Results:    Results from last 7 days  Lab Units 12/11/18  0529 12/10/18  1912   WBC Thousand/uL 12 31*  --    HEMOGLOBIN g/dL 12 8 13 1   HEMATOCRIT % 37 7 38 4   PLATELETS Thousands/uL 197 205       Results from last 7 days  Lab Units 12/11/18  0529   POTASSIUM mmol/L 4 4   CHLORIDE mmol/L 106   CO2 mmol/L 29   BUN mg/dL 15   CREATININE mg/dL 0 67   CALCIUM mg/dL 8 4                 No results found for: TROPONINT  ABG:No results found for: PHART, JUX6YPZ, PO2ART, XZY6LWC, Y2RSQPHD, BEART, SOURCE    Imaging Studies: I have personally reviewed pertinent reports     and I have personally reviewed pertinent films in PACS    Xr Spine Lumbar 2 Or 3 Views Injury    Result Date: 12/10/2018  Impression: Fluoroscopic guidance provided for surgical procedure  Please refer to the separate procedure notes for additional details  Workstation performed: [id number]     EKG, Pathology, and Other Studies: I have personally reviewed pertinent reports        VTE Pharmacologic Prophylaxis: Enoxaparin (Lovenox)    VTE Mechanical Prophylaxis: sequential compression device

## 2018-12-12 NOTE — PLAN OF CARE
DISCHARGE PLANNING - CARE MANAGEMENT     Discharge to post-acute care or home with appropriate resources Progressing        INFECTION - ADULT     Absence or prevention of progression during hospitalization Progressing     Absence of fever/infection during neutropenic period Progressing        Knowledge Deficit     Patient/family/caregiver demonstrates understanding of disease process, treatment plan, medications, and discharge instructions Progressing        PAIN - ADULT     Verbalizes/displays adequate comfort level or baseline comfort level Progressing        Potential for Falls     Patient will remain free of falls Progressing        SAFETY ADULT     Maintain or return to baseline ADL function Progressing     Maintain or return mobility status to optimal level Progressing

## 2018-12-13 ENCOUNTER — TELEPHONE (OUTPATIENT)
Dept: NEUROSURGERY | Facility: CLINIC | Age: 78
End: 2018-12-13

## 2018-12-13 NOTE — TELEPHONE ENCOUNTER
Spoke with Miguelito Osborne to see how he is doing after surgery 12/10/2018 with hospital discharge date of yesterday  He reports that he is doing well overall and denies any incisional issues or fevers  Advised that after three days he may take a shower and allow soapy water to wash over the surgical site, no direct contact with the water stream, and do not submerge in water until cleared by the surgeon  Went over incision care with patient including keeping incision clean and dry and not to apply any creams or ointments to the site, he has no further questions at this time  Verified date/time/location of his upcoming POV on 12/21/2018 and advised him to call the office with any further questions or concerns, or if any incisional issues or fevers would arise  Patient was appreciative for the call

## 2018-12-19 ENCOUNTER — TELEPHONE (OUTPATIENT)
Dept: NEUROSURGERY | Facility: CLINIC | Age: 78
End: 2018-12-19

## 2018-12-19 NOTE — TELEPHONE ENCOUNTER
Pt had some confusion about needed xray  Explained it was not needed for upcoming 2 week appt  But should be done 3-5 days prior to his 1/22 appt with Dr Aristeo Lopez    He stated an understanding

## 2018-12-21 ENCOUNTER — CLINICAL SUPPORT (OUTPATIENT)
Dept: NEUROSURGERY | Facility: CLINIC | Age: 78
End: 2018-12-21

## 2018-12-21 VITALS — SYSTOLIC BLOOD PRESSURE: 138 MMHG | DIASTOLIC BLOOD PRESSURE: 68 MMHG | TEMPERATURE: 98 F

## 2018-12-21 DIAGNOSIS — Z98.890 POST-OPERATIVE STATE: Primary | ICD-10-CM

## 2018-12-21 PROCEDURE — 99024 POSTOP FOLLOW-UP VISIT: CPT

## 2018-12-21 NOTE — PROGRESS NOTES
Post-Op Visit-Neurosurgery    Adrian Ridley 77 y o  male MRN: [id number]    Chief Complaint  Patient presents post: L4-5 posterior decompression with instrumented fixation fusion; transforaminal lumbar interbody fusion (Bilateral Spine Lumbar)    History of Present Illness  Patient presents for 2 week POV for incision check  Arrived accompanied by his wife and ambulated with cane to exam room  Uncoordinated gait noted  Reports no improvement or worsening in the weakness in his legs  Pain is rated a 1/10 and he admits to occasionally taking tylenol with adequate relief  Requesting clearance to return to work  Assessment  Wound Exam: Incision is clean, dry, and in tact, well approximated, without heat, swelling, pain, or drainage  Some redness noted around the staple insertion sites  Small amount of superificial skin spreading  No obvious s/s of infection  See image below:            Procedure  Staple/suture removal    location: Lumbar spine region   Procedure Note: 21 staples and 1 drain suture was removed  Patient Status:  the patient tolerated the procedure well  Complications: None  Incision AARON  Discussion/Summary  After staple and suture removal cleaned incisions with NSS and applied 1 steri strip to upper portion of incision for support  Left AARON  Sent letter to employer at his request to excuse him from work until cleared at his 6w post op visit  Reviewed incision care with patient including daily observation for s/s infection including: increased erythema, edema, drainage, dehiscence of incision or fever >101  Should these be observed, he understands that he is to call and/or return immediately for reassessment  Advised patient to continue cleansing area with mild soap and water and pat dry  Not to apply any lotions, creams, or ointments, & not to submerge in any water for two more weeks   He is to maintain activity restrictions until cleared by the surgeon  Activity levels were also reviewed with the patient in detail, he is to lift no greater than 10 pounds, advised to limit bend/twisting at the waist and ambulation is encouraged as tolerated  Verified date/time/location of upcoming POV and reminded him to complete x-rays prior to his visit on 01/22/2018   He is to call the office with any further questions or concerns, or if any incisional issues or fevers would arise

## 2019-01-17 ENCOUNTER — APPOINTMENT (OUTPATIENT)
Dept: RADIOLOGY | Facility: CLINIC | Age: 79
End: 2019-01-17
Payer: MEDICARE

## 2019-01-17 DIAGNOSIS — M48.062 LUMBAR STENOSIS WITH NEUROGENIC CLAUDICATION: ICD-10-CM

## 2019-01-17 DIAGNOSIS — Z98.890 POSTOPERATIVE STATE: ICD-10-CM

## 2019-01-17 DIAGNOSIS — M43.16 SPONDYLOLISTHESIS OF LUMBAR REGION: ICD-10-CM

## 2019-01-17 PROCEDURE — 72100 X-RAY EXAM L-S SPINE 2/3 VWS: CPT

## 2019-01-22 ENCOUNTER — OFFICE VISIT (OUTPATIENT)
Dept: NEUROSURGERY | Facility: CLINIC | Age: 79
End: 2019-01-22

## 2019-01-22 VITALS
TEMPERATURE: 97.7 F | HEART RATE: 68 BPM | RESPIRATION RATE: 16 BRPM | DIASTOLIC BLOOD PRESSURE: 70 MMHG | WEIGHT: 196 LBS | HEIGHT: 69 IN | SYSTOLIC BLOOD PRESSURE: 139 MMHG | BODY MASS INDEX: 29.03 KG/M2

## 2019-01-22 DIAGNOSIS — M21.372 FOOT DROP, BILATERAL: ICD-10-CM

## 2019-01-22 DIAGNOSIS — Z98.1 STATUS POST LUMBAR SPINAL FUSION: Primary | ICD-10-CM

## 2019-01-22 DIAGNOSIS — M21.372 FOOT DROP, LEFT: ICD-10-CM

## 2019-01-22 DIAGNOSIS — M21.371 FOOT DROP, BILATERAL: ICD-10-CM

## 2019-01-22 PROBLEM — M48.062 LUMBAR STENOSIS WITH NEUROGENIC CLAUDICATION: Status: RESOLVED | Noted: 2018-10-29 | Resolved: 2019-01-22

## 2019-01-22 PROCEDURE — 99024 POSTOP FOLLOW-UP VISIT: CPT | Performed by: NEUROLOGICAL SURGERY

## 2019-01-22 NOTE — PROGRESS NOTES
Office Note - Neurosurgery   Maliha Magana 77 y o  male MRN: [id number]      Assessment:    Patient is gradually improving  76-year-old gentleman post L4-5 posterior decompression fusion  He is doing well clinically with improvement in symptoms of neurogenic claudication  His bilateral footdrop remained stable  He will begin physical therapy for core strengthening range of motion exercises and balance training  He will follow-up in 6 months time with repeat flexion-extension film of the lumbar spine  History, physical examination and diagnostic tests were reviewed and questions answered  Diagnosis, care plan and treatment options were discussed  The patient and spouse/SO understand instructions and will follow up as directed  Plan:    Follow-up:   6 months    Problem List Items Addressed This Visit        Other    Foot drop, left    Foot drop, bilateral    Status post lumbar spinal fusion - Primary    Relevant Orders    X-ray lumbar spine flexion and extension only 4+ views          Subjective/Objective     Chief Complaint    None  HPI    Six weeks post lumbar decompression and fusion  He has significant improvement in his standing and walking tolerance  He continues to have bilateral foot drop similar to prior to surgery  He denies any new pain, weakness or numbness in his legs or difficulties with bowel bladder function or changing perineal sensation  He is pleased the results of surgery  GAMA MALLOY personally reviewed  Review of Systems   Constitutional: Negative for chills, fatigue and fever  HENT: Negative  Eyes: Negative for pain and visual disturbance  Respiratory: Negative for cough, shortness of breath and wheezing  Cardiovascular: Negative for chest pain and palpitations  Gastrointestinal: Negative for abdominal pain and nausea  Genitourinary: Negative for difficulty urinating  Musculoskeletal: Positive for gait problem   Negative for arthralgias, back pain, neck pain and neck stiffness  Neurological: Negative for dizziness, speech difficulty, weakness, numbness and headaches  Family History    No family history on file  Social History    Social History     Social History    Marital status: /Civil Union     Spouse name: N/A    Number of children: N/A    Years of education: N/A     Occupational History    Not on file       Social History Main Topics    Smoking status: Former Smoker    Smokeless tobacco: Never Used    Alcohol use Yes      Comment: MOD    Drug use: No    Sexual activity: No     Other Topics Concern    Not on file     Social History Narrative    No narrative on file       Past Medical History    Past Medical History:   Diagnosis Date    Diphtheria     History of chicken pox     Kidney stones     Lumbar stenosis     Measles        Surgical History    Past Surgical History:   Procedure Laterality Date    KIDNEY STONE SURGERY      [place] ARTHRODESIS POSTERIOR/POSTEROLATERAL LUMBAR Bilateral 12/10/2018    Procedure: L4-5 posterior decompression with instrumented fixation fusion; transforaminal lumbar interbody fusion;  Surgeon: Teddy Robertson MD;  Location: [facility];  Service: Neurosurgery       Medications      Current Outpatient Prescriptions:     acetaminophen (TYLENOL) 500 mg tablet, Take 1,000 mg by mouth every 6 (six) hours as needed, Disp: , Rfl:     cholecalciferol (VITAMIN D3) 400 units tablet, Take 400 Units by mouth daily, Disp: , Rfl:     Cyanocobalamin (VITAMIN B-12) 5000 MCG TBDP, Take by mouth daily, Disp: , Rfl:     Allergies    Allergies   Allergen Reactions    Iodine Rash       The following portions of the patient's history were reviewed and updated as appropriate: allergies, current medications, past family history, past medical history, past social history, past surgical history and problem list     Investigations    I personally reviewed the XRAY results with the patient:    Upright plain film lumbar spine dated January 17th, 2019  Comparison to previous imaging  Stable appearance of instrumentation and lumbar alignment with grade 1 anterolisthesis at L4-5  No evidence of hardware loosening or failure  Stable degenerative changes throughout the lumbar spine  Physical Exam    Vitals:  Blood pressure 139/70, pulse 68, temperature 97 7 °F (36 5 °C), temperature source Tympanic, resp  rate 16, height 5' 9" (1 753 m), weight 88 9 kg (196 lb)  ,Body mass index is 28 94 kg/m²  Physical Exam   Constitutional: He is oriented to person, place, and time  He appears well-developed and well-nourished  No distress  Musculoskeletal:   Good range of motion on flexion-extension of the lumbar spine without pain  No calf swelling or tenderness  Neurological: He is alert and oriented to person, place, and time  Full power in lower extremities aside from 0-1/5 power on dorsiflexion bilaterally  Skin: Skin is warm and dry  Incision well healed  Psychiatric: He has a normal mood and affect  His behavior is normal    Vitals reviewed  Neurologic Exam     Mental Status   Oriented to person, place, and time

## 2019-01-22 NOTE — LETTER
January 22, 2019     Brijesh Ulloa, [address]     Patient: Jahaira Robb   YOB: 1940   Date of Visit: 1/22/2019       Dear Dr Violet Hwang: Thank you for referring Pam Damico to me for evaluation  Below are my notes for this consultation  If you have questions, please do not hesitate to call me  I look forward to following your patient along with you  Sincerely,        Tru Farrell MD        CC: No Recipients  Tru Farrell MD  1/22/2019 12:52 PM  Sign at close encounter  Office Note - Neurosurgery   Jahaira Robb 77 y o  male MRN: [id number]      Assessment:    Patient is gradually improving  76-year-old gentleman post L4-5 posterior decompression fusion  He is doing well clinically with improvement in symptoms of neurogenic claudication  His bilateral footdrop remained stable  He will begin physical therapy for core strengthening range of motion exercises and balance training  He will follow-up in 6 months time with repeat flexion-extension film of the lumbar spine  History, physical examination and diagnostic tests were reviewed and questions answered  Diagnosis, care plan and treatment options were discussed  The patient and spouse/SO understand instructions and will follow up as directed  Plan:    Follow-up:   6 months    Problem List Items Addressed This Visit        Other    Foot drop, left    Foot drop, bilateral    Status post lumbar spinal fusion - Primary    Relevant Orders    X-ray lumbar spine flexion and extension only 4+ views          Subjective/Objective     Chief Complaint    None  HPI    Six weeks post lumbar decompression and fusion  He has significant improvement in his standing and walking tolerance  He continues to have bilateral foot drop similar to prior to surgery    He denies any new pain, weakness or numbness in his legs or difficulties with bowel bladder function or changing perineal sensation  He is pleased the results of surgery  GAMA MALLOY personally reviewed  Review of Systems   Constitutional: Negative for chills, fatigue and fever  HENT: Negative  Eyes: Negative for pain and visual disturbance  Respiratory: Negative for cough, shortness of breath and wheezing  Cardiovascular: Negative for chest pain and palpitations  Gastrointestinal: Negative for abdominal pain and nausea  Genitourinary: Negative for difficulty urinating  Musculoskeletal: Positive for gait problem  Negative for arthralgias, back pain, neck pain and neck stiffness  Neurological: Negative for dizziness, speech difficulty, weakness, numbness and headaches  Family History    No family history on file  Social History    Social History     Social History    Marital status: /Civil Union     Spouse name: N/A    Number of children: N/A    Years of education: N/A     Occupational History    Not on file       Social History Main Topics    Smoking status: Former Smoker    Smokeless tobacco: Never Used    Alcohol use Yes      Comment: MOD    Drug use: No    Sexual activity: No     Other Topics Concern    Not on file     Social History Narrative    No narrative on file       Past Medical History    Past Medical History:   Diagnosis Date    Diphtheria     History of chicken pox     Kidney stones     Lumbar stenosis     Measles        Surgical History    Past Surgical History:   Procedure Laterality Date    KIDNEY STONE SURGERY      [place] ARTHRODESIS POSTERIOR/POSTEROLATERAL LUMBAR Bilateral 12/10/2018    Procedure: L4-5 posterior decompression with instrumented fixation fusion; transforaminal lumbar interbody fusion;  Surgeon: Hedy Ontiveros MD;  Location: [facility];  Service: Neurosurgery       Medications      Current Outpatient Prescriptions:     acetaminophen (TYLENOL) 500 mg tablet, Take 1,000 mg by mouth every 6 (six) hours as needed, Disp: , Rfl:    cholecalciferol (VITAMIN D3) 400 units tablet, Take 400 Units by mouth daily, Disp: , Rfl:     Cyanocobalamin (VITAMIN B-12) 5000 MCG TBDP, Take by mouth daily, Disp: , Rfl:     Allergies    Allergies   Allergen Reactions    Iodine Rash       The following portions of the patient's history were reviewed and updated as appropriate: allergies, current medications, past family history, past medical history, past social history, past surgical history and problem list     Investigations    I personally reviewed the XRAY results with the patient:    Upright plain film lumbar spine dated January 17th, 2019  Comparison to previous imaging  Stable appearance of instrumentation and lumbar alignment with grade 1 anterolisthesis at L4-5  No evidence of hardware loosening or failure  Stable degenerative changes throughout the lumbar spine  Physical Exam    Vitals:  Blood pressure 139/70, pulse 68, temperature 97 7 °F (36 5 °C), temperature source Tympanic, resp  rate 16, height 5' 9" (1 753 m), weight 88 9 kg (196 lb)  ,Body mass index is 28 94 kg/m²  Physical Exam   Constitutional: He is oriented to person, place, and time  He appears well-developed and well-nourished  No distress  Musculoskeletal:   Good range of motion on flexion-extension of the lumbar spine without pain  No calf swelling or tenderness  Neurological: He is alert and oriented to person, place, and time  Full power in lower extremities aside from 0-1/5 power on dorsiflexion bilaterally  Skin: Skin is warm and dry  Incision well healed  Psychiatric: He has a normal mood and affect  His behavior is normal    Vitals reviewed  Neurologic Exam     Mental Status   Oriented to person, place, and time

## 2019-01-23 DIAGNOSIS — Z98.890 STATUS POST SURGERY: Primary | ICD-10-CM

## 2019-01-30 ENCOUNTER — APPOINTMENT (OUTPATIENT)
Dept: PHYSICAL THERAPY | Facility: CLINIC | Age: 79
End: 2019-01-30
Payer: MEDICARE

## 2019-02-01 ENCOUNTER — EVALUATION (OUTPATIENT)
Dept: PHYSICAL THERAPY | Facility: CLINIC | Age: 79
End: 2019-02-01
Payer: MEDICARE

## 2019-02-01 DIAGNOSIS — R29.898 WEAKNESS OF BOTH LEGS: ICD-10-CM

## 2019-02-01 DIAGNOSIS — Z98.1 S/P LUMBAR FUSION: Primary | ICD-10-CM

## 2019-02-01 PROCEDURE — 97162 PT EVAL MOD COMPLEX 30 MIN: CPT | Performed by: PHYSICAL THERAPIST

## 2019-02-01 PROCEDURE — 97110 THERAPEUTIC EXERCISES: CPT | Performed by: PHYSICAL THERAPIST

## 2019-02-01 NOTE — PROGRESS NOTES
PT Evaluation     Today's date: 2019  Patient name: Clary Alcantara  : 1940  MRN: [id number]  Referring provider: Nick Kidd MD  Dx:   Encounter Diagnosis     ICD-10-CM    1  S/P lumbar fusion Z98 1    2  Weakness of both legs R29 898                   Assessment  Assessment details: Clary Alcantara is a 77 y o  male present with:   S/P lumbar fusion  (primary encounter diagnosis)  Weakness of both legs    Asiya Ramirez has the above listed impairments and will benefit from skilled PT to improve deficits to return to prior level of function  Impairments: abnormal muscle firing, abnormal or restricted ROM, activity intolerance, impaired physical strength, lacks appropriate home exercise program and pain with function  Barriers to therapy: Chronic foot drop  Understanding of Dx/Px/POC: good   Prognosis: good    Goals  Impairment Goals  - Increase strength     Functional Goals  - Return to Prior Level of Function  - Increase Functional Status Measure  - Negotiate one flight of stairs with minimal use of railing  - Patient will be independent with HEP    Plan  Patient would benefit from: skilled PT  Planned therapy interventions: joint mobilization, manual therapy, patient education, postural training, activity modification, abdominal trunk stabilization, body mechanics training, flexibility, functional ROM exercises, graded exercise, home exercise program, neuromuscular re-education, strengthening, stretching, therapeutic activities and therapeutic exercise  Frequency: 2x week  Duration in weeks: 8  Treatment plan discussed with: patient        Subjective Evaluation    History of Present Illness  Mechanism of injury: 78-year-old gentleman post L4-5 posterior decompression fusion  He is doing well clinically with improvement in symptoms of neurogenic claudication  His bilateral footdrop remained stable  He reports that prior to surgery he experienced a signif increase in generalized LE weakness    He denies LBP but does experience knee pain      Pain  No pain reported  Current pain ratin  At best pain ratin  At worst pain ratin      Diagnostic Tests  X-ray: normal  Treatments  No previous or current treatments  Patient Goals  Patient goals for therapy: independence with ADLs/IADLs and increased strength          Objective   Ataxic gait  Forward flexed posture  Slight LOB with sit to stand  trunk flexion ROM wnl  Unable to SLB w/o UE support (L > R)    MMT:  Hip flx R 4-/5, L 3+/5  Hip abd R 4-/5    L 3-/5  Hip ext R wnl, L 3+/5  Hip IR R  L 0/5     Quad wnl  Ham wnl    DF L 0, R trace    Precautions: s/p fusion, bilat foot drop    Daily Treatment Diary   Assist with activation of hip exs    Manuals                                                                 Exercise Diary              Abdominal hollowing             PPT             Reverse clam shell             Prone hip IR knees bent             SLB             sidestep             biodex                                                                                                                                                                                                                Modalities

## 2019-02-07 ENCOUNTER — OFFICE VISIT (OUTPATIENT)
Dept: PHYSICAL THERAPY | Facility: CLINIC | Age: 79
End: 2019-02-07
Payer: MEDICARE

## 2019-02-07 DIAGNOSIS — Z98.1 S/P LUMBAR FUSION: Primary | ICD-10-CM

## 2019-02-07 DIAGNOSIS — R29.898 WEAKNESS OF BOTH LEGS: ICD-10-CM

## 2019-02-07 PROCEDURE — 97110 THERAPEUTIC EXERCISES: CPT | Performed by: PHYSICAL THERAPIST

## 2019-02-07 NOTE — PROGRESS NOTES
Daily Note     Today's date: 2019  Patient name: Elif Parker  : 1940  MRN: [id number]  Referring provider: Brian Maloney MD  Dx:   Encounter Diagnosis     ICD-10-CM    1  S/P lumbar fusion Z98 1    2  Weakness of both legs R29 898                   Subjective: pt reports that he has slight increase in LBP since starting exs, attributes this to prone SLR      Objective: See treatment diary below  Precautions: s/p fusion, bilat foot drop     Daily Treatment Diary   Assist with activation of hip exs     Manuals                                                                                                                    Exercise Diary                        Abdominal hollowing    10"x10                   PPT    30                   Reverse clam shell    3x10                   Prone hip IR knees bent    3x10 bilat                   SLB    3' ea                   sidestep     3'                   biodex catch   4'                    glute squeeze    30                    prone ham curl    10                   bike   6'                                                                                                                                                                                                                                                                                                                   Modalities                                                                                 Assessment: Tolerated treatment fair  Patient would benefit from continued PT      Plan: Continue per plan of care

## 2019-02-11 ENCOUNTER — APPOINTMENT (OUTPATIENT)
Dept: PHYSICAL THERAPY | Facility: CLINIC | Age: 79
End: 2019-02-11
Payer: MEDICARE

## 2019-02-14 ENCOUNTER — OFFICE VISIT (OUTPATIENT)
Dept: PHYSICAL THERAPY | Facility: CLINIC | Age: 79
End: 2019-02-14
Payer: MEDICARE

## 2019-02-14 DIAGNOSIS — Z98.1 S/P LUMBAR FUSION: Primary | ICD-10-CM

## 2019-02-14 DIAGNOSIS — R29.898 WEAKNESS OF BOTH LEGS: ICD-10-CM

## 2019-02-14 PROCEDURE — 97110 THERAPEUTIC EXERCISES: CPT | Performed by: PHYSICAL THERAPIST

## 2019-02-14 PROCEDURE — 97112 NEUROMUSCULAR REEDUCATION: CPT | Performed by: PHYSICAL THERAPIST

## 2019-02-14 NOTE — PROGRESS NOTES
Daily Note     Today's date: 2019  Patient name: Sawyer Cortez  : 1940  MRN: [id number]  Referring provider: Malini Woods MD  Dx:   Encounter Diagnosis     ICD-10-CM    1  S/P lumbar fusion Z98 1    2  Weakness of both legs R29 898                   Subjective: no sig changes reported    Objective: See treatment diary below  Precautions: s/p fusion, bilat foot drop     Daily Treatment Diary   Assist with activation of hip exs     Manuals                                                                                                                  Exercise Diary                        Abdominal hollowing    10"x10                   PPT    30                   Reverse clam shell    3x10  3x10                 Prone hip IR knees bent    3x10 bilat  aa x10                 SLB    3' ea  3 ea                 sidestep     3'  3'                 biodex catch   4'  4'                  glute squeeze    30  30                  prone ham curl    10 20                  bike   6'                    ham curls apollo - single      x2 pl 3x10                  bridge w/ca      3x10                                                                                                                                                                                                                                                                 Modalities                                                                                 Assessment: Tolerated treatment fair  Patient would benefit from continued PT  signif weakness persists      Plan: Continue per plan of care

## 2019-02-18 ENCOUNTER — OFFICE VISIT (OUTPATIENT)
Dept: PHYSICAL THERAPY | Facility: CLINIC | Age: 79
End: 2019-02-18
Payer: MEDICARE

## 2019-02-18 DIAGNOSIS — R29.898 WEAKNESS OF BOTH LEGS: Primary | ICD-10-CM

## 2019-02-18 PROCEDURE — 97530 THERAPEUTIC ACTIVITIES: CPT | Performed by: PHYSICAL THERAPIST

## 2019-02-18 PROCEDURE — 97110 THERAPEUTIC EXERCISES: CPT | Performed by: PHYSICAL THERAPIST

## 2019-02-18 PROCEDURE — 97112 NEUROMUSCULAR REEDUCATION: CPT | Performed by: PHYSICAL THERAPIST

## 2019-02-18 NOTE — PROGRESS NOTES
Daily Note     Today's date: 19  Patient name: Lavell Gallagher  : 1940  MRN: [id number]  Referring provider: Ayala Mcnally MD  Dx:   Encounter Diagnosis     ICD-10-CM    1  Weakness of both legs R29 898        Start Time: 245  Stop Time: 345  Total time in clinic (min): 60 minutes    Subjective: no sig changes reported    Objective: See treatment diary below  Precautions: s/p fusion, bilat foot drop     Daily Treatment Diary   Assist with activation of hip exs     Manuals                                                                                                                Exercise Diary                        Abdominal hollowing    10"x10    10"x20               PPT    30    30               Reverse clam shell    3x10  3x10                 Prone hip IR knees bent    3x10 bilat  aa x10  aa x30               SLB    3' ea  3 ea                 sidestep     3'  3'                 biodex catch   4'  4'  4'                glute squeeze    30  30  30                prone ham curl    10 20  hold                bike   6'    10'                ham curls apollo - single      x2 pl 3x10  2 pl 3x10                bridge w/ca      3x10  3x10                standing hip extension (with step)        g x30                                                                                                                                                                                                                                       Modalities                                                                                 Assessment: Tolerated treatment fair  Patient would benefit from continued PT  signif weakness persists      Plan: Continue per plan of care

## 2019-02-21 ENCOUNTER — OFFICE VISIT (OUTPATIENT)
Dept: PHYSICAL THERAPY | Facility: CLINIC | Age: 79
End: 2019-02-21
Payer: MEDICARE

## 2019-02-21 DIAGNOSIS — R29.898 WEAKNESS OF BOTH LEGS: Primary | ICD-10-CM

## 2019-02-21 DIAGNOSIS — Z98.1 S/P LUMBAR FUSION: ICD-10-CM

## 2019-02-21 PROCEDURE — 97110 THERAPEUTIC EXERCISES: CPT | Performed by: PHYSICAL THERAPIST

## 2019-02-21 PROCEDURE — 97530 THERAPEUTIC ACTIVITIES: CPT | Performed by: PHYSICAL THERAPIST

## 2019-02-21 PROCEDURE — 97112 NEUROMUSCULAR REEDUCATION: CPT | Performed by: PHYSICAL THERAPIST

## 2019-02-21 NOTE — PROGRESS NOTES
Daily Note     Today's date: 19  Patient name: Lavell Gallagher  : 1940  MRN: [id number]  Referring provider: Ayala Mcnally MD  Dx:   Encounter Diagnosis     ICD-10-CM    1  Weakness of both legs R29 898    2  S/P lumbar fusion Z98 1                   Subjective: no sig changes reported    Objective: See treatment diary below  Precautions: s/p fusion, bilat foot drop     Daily Treatment Diary   Assist with activation of hip exs     Manuals                                                                                                              Exercise Diary                        Abdominal hollowing    10"x10    10"x20               PPT    30    30               Reverse clam shell    3x10  3x10    3x10             Prone hip IR knees bent    3x10 bilat  aa x10  aa x30  30             SLB    3' ea  3 ea    3' ea             sidestep     3'  3'                 biodex catch   4'  4'  4'  hold              glute squeeze    30  30  30  30              prone ham curl    10 20  hold                bike   6'    10'                ham curls apollo - single      x2 pl 3x10  2 pl 3x10  2pl 3x10              bridge w/ca      3x10  3x10  nv              standing hip extension (with step)        g x30  yessi 3x10              TM          8'                                                                                                                                                                                                             Modalities                                                                                 Assessment: Tolerated treatment fair  Appeared to fatigue more quickly today   Patient would benefit from continued PT  signif weakness persists        Plan: Continue per plan of care

## 2019-02-25 ENCOUNTER — OFFICE VISIT (OUTPATIENT)
Dept: PHYSICAL THERAPY | Facility: CLINIC | Age: 79
End: 2019-02-25
Payer: MEDICARE

## 2019-02-25 DIAGNOSIS — R29.898 WEAKNESS OF BOTH LEGS: Primary | ICD-10-CM

## 2019-02-25 DIAGNOSIS — Z98.1 S/P LUMBAR FUSION: ICD-10-CM

## 2019-02-25 PROCEDURE — 97530 THERAPEUTIC ACTIVITIES: CPT | Performed by: PHYSICAL THERAPIST

## 2019-02-25 PROCEDURE — 97112 NEUROMUSCULAR REEDUCATION: CPT | Performed by: PHYSICAL THERAPIST

## 2019-02-25 PROCEDURE — 97110 THERAPEUTIC EXERCISES: CPT | Performed by: PHYSICAL THERAPIST

## 2019-02-25 NOTE — PROGRESS NOTES
Daily Note     Today's date: 19  Patient name: Vincent Fernández  : 1940  MRN: [id number]  Referring provider: Sharmila Bejarano MD  Dx:   Encounter Diagnosis     ICD-10-CM    1  Weakness of both legs R29 898    2  S/P lumbar fusion Z98 1                   Subjective: no sig changes reported    Objective: See treatment diary below  Precautions: s/p fusion, bilat foot drop     Daily Treatment Diary   Assist with activation of hip exs     Manuals                                                                                                            Exercise Diary                        Abdominal hollowing    10"x10    10"x20               PPT    30    30               Reverse clam shell    3x10  3x10    3x10  nv           Prone hip IR knees bent    3x10 bilat  aa x10  aa x30  30  30           SLB    3' ea  3 ea    3' ea  3' ea           Sidestep with hip IR     3'  3'      3'           biodex catch   4'  4'  4'  hold              glute squeeze    30  30  30  30              prone ham curl    10 20  hold                bike   6'    10'    10'            ham curls apollo - single      x2 pl 3x10  2 pl 3x10  2pl 3x10  3pl 3x10            bridge /ca, march      3x10  3x10  nv  3x10            standing hip extension (with step)        g x30  yessi 3x10  blue 2x15            TM          8'  -            supine LE ER            yel x30                                                                                                                                                                                   Modalities                                                                                 Assessment: Tolerated treatment fair  Fatigued with exs   Patient would benefit from continued PT  signif weakness persists        Plan: Continue per plan of care

## 2019-02-28 ENCOUNTER — OFFICE VISIT (OUTPATIENT)
Dept: PHYSICAL THERAPY | Facility: CLINIC | Age: 79
End: 2019-02-28
Payer: MEDICARE

## 2019-02-28 DIAGNOSIS — R29.898 WEAKNESS OF BOTH LEGS: Primary | ICD-10-CM

## 2019-02-28 DIAGNOSIS — Z98.1 S/P LUMBAR FUSION: ICD-10-CM

## 2019-02-28 PROCEDURE — 97530 THERAPEUTIC ACTIVITIES: CPT | Performed by: PHYSICAL THERAPIST

## 2019-02-28 PROCEDURE — 97112 NEUROMUSCULAR REEDUCATION: CPT | Performed by: PHYSICAL THERAPIST

## 2019-02-28 PROCEDURE — 97110 THERAPEUTIC EXERCISES: CPT | Performed by: PHYSICAL THERAPIST

## 2019-02-28 NOTE — PROGRESS NOTES
Daily Note     Today's date: 19  Patient name: Nabeel Agrawal  : 1940  MRN: [id number]  Referring provider: Jodi Pinedo MD  Dx:   Encounter Diagnosis     ICD-10-CM    1  Weakness of both legs R29 898    2  S/P lumbar fusion Z98 1                   Subjective: pt reports feeling very tired today    Objective: See treatment diary below  Precautions: s/p fusion, bilat foot drop     Daily Treatment Diary   Assist with activation of hip exs     Manuals                                                                                                          Exercise Diary                        Abdominal hollowing    10"x10    10"x20               PPT    30    30               Reverse clam shell    3x10  3x10    3x10  nv  3x10         Prone hip IR knees bent    3x10 bilat  aa x10  aa x30  30  30  3x10         SLB    3' ea  3 ea    3' ea  3' ea           Sidestep with hip IR     3'  3'      3'  3'1 pl         biodex catch   4'  4'  4'  hold              glute squeeze    30  30  30  30              prone ham curl    10 20  hold                bike   6'    10'    10'  10          ham curls apollo - single      x2 pl 3x10  2 pl 3x10  2pl 3x10  3pl 3x10  1 pl 3x10          bridge w/ca, march      3x10  3x10  nv  3x10  3x10          standing hip extension (with step)        g x30  yessi 3x10  blue 2x15  nv          TM          8'  -            supine LE ER            yel x30  y 3x10                                                                                                                                                                                 Modalities                                                                                 Assessment: Tolerated treatment fair  Fatigued with exs   Patient would benefit from continued PT  signif weakness persists        Plan: Continue per plan of care

## 2019-03-04 ENCOUNTER — OFFICE VISIT (OUTPATIENT)
Dept: PHYSICAL THERAPY | Facility: CLINIC | Age: 79
End: 2019-03-04
Payer: MEDICARE

## 2019-03-04 DIAGNOSIS — R29.898 WEAKNESS OF BOTH LEGS: Primary | ICD-10-CM

## 2019-03-04 DIAGNOSIS — Z98.1 S/P LUMBAR FUSION: ICD-10-CM

## 2019-03-04 PROCEDURE — 97112 NEUROMUSCULAR REEDUCATION: CPT

## 2019-03-04 PROCEDURE — 97110 THERAPEUTIC EXERCISES: CPT

## 2019-03-04 PROCEDURE — 97530 THERAPEUTIC ACTIVITIES: CPT

## 2019-03-04 NOTE — PROGRESS NOTES
Daily Note     Today's date: 19  Patient name: Lavern Figueroa  : 1940  MRN: [id number]  Referring provider: Esthela Benavides MD  Dx:   Encounter Diagnosis     ICD-10-CM    1  Weakness of both legs R29 898    2  S/P lumbar fusion Z98 1                   Subjective: Pt reports his legs are getting stronger  Objective: See treatment diary below  Precautions: s/p fusion, bilat foot drop     Daily Treatment Diary   Assist with activation of hip exs     Manuals 2/1  2/7  2/14  2/18  2/21  2/25  2/28  3/4                                                                                                       Exercise Diary                        Abdominal hollowing    10"x10    10"x20               PPT    30    30               Reverse clam shell    3x10  3x10    3x10  nv  3x10  3x10       Prone hip IR knees bent    3x10 bilat  aa x10  aa x30  30  30  3x10  3x10       SLB    3' ea  3 ea    3' ea  3' ea           Sidestep with hip IR     3'  3'      3'  3'1 pl  3'       biodex catch   4'  4'  4'  hold              glute squeeze    30  30  30  30              prone ham curl    10 20  hold                bike   6'    10'    10'  10  10'        ham curls apollo - single      x2 pl 3x10  2 pl 3x10  2pl 3x10  3pl 3x10  1 pl 3x10  2pl  3x10        bridge w/ca, march      3x10  3x10  nv  3x10  3x10  3x10        standing hip extension (with step)        g x30  yessi 3x10  blue 2x15  nv  Blue  2x15        TM          8'  -            supine LE ER            yel x30  y 3x10  y3x10                                                                                                                                                                               Modalities                                                                                 Assessment: Tolerated treatment fair  Prone exercises are challenging  Fatigue cont during/after exercise program   Patient would benefit from continued PT     Plan: Continue per plan of care

## 2019-03-07 ENCOUNTER — OFFICE VISIT (OUTPATIENT)
Dept: PHYSICAL THERAPY | Facility: CLINIC | Age: 79
End: 2019-03-07
Payer: MEDICARE

## 2019-03-07 DIAGNOSIS — Z98.1 S/P LUMBAR FUSION: ICD-10-CM

## 2019-03-07 DIAGNOSIS — R29.898 WEAKNESS OF BOTH LEGS: Primary | ICD-10-CM

## 2019-03-07 PROCEDURE — 97110 THERAPEUTIC EXERCISES: CPT | Performed by: PHYSICAL THERAPIST

## 2019-03-07 NOTE — PROGRESS NOTES
Daily Note     Today's date: 19  Patient name: Bernarda Saeed  : 1940  MRN: [id number]  Referring provider: Parish Estrella MD  Dx:   Encounter Diagnosis     ICD-10-CM    1  Weakness of both legs R29 898    2  S/P lumbar fusion Z98 1                   Subjective: Pt reports his legs are getting stronger  Objective: See treatment diary below  Precautions: s/p fusion, bilat foot drop     Daily Treatment Diary   Assist with activation of hip exs  1 on 1 for 15 min     Manuals 2/1  2/7  2/14  2/18  2/21  2/25  2/28  3/4  3/17                                                                                                     Exercise Diary                        Abdominal hollowing    10"x10    10"x20               PPT    30    30               Reverse clam shell    3x10  3x10    3x10  nv  3x10  3x10  3x10     Prone hip IR knees bent    3x10 bilat  aa x10  aa x30  30  30  3x10  3x10  3x10 LBP     SLB    3' ea  3 ea    3' ea  3' ea           Sidestep with hip IR     3'  3'      3'  3'1 pl  3'  3'     biodex catch   4'  4'  4'  hold              glute squeeze    30  30  30  30              prone ham curl    10 20  hold                bike   6'    10'    10'  10  10'  10'      ham curls apollo - single      x2 pl 3x10  2 pl 3x10  2pl 3x10  3pl 3x10  1 pl 3x10  2pl  3x10        bridge w/ca, march      3x10  3x10  nv  3x10  3x10  3x10        standing hip extension (with step)        g x30  yessi 3x10  blue 2x15  nv  Blue  2x15        TM          8'  -            supine LE ER            yel x30  y 3x10  y3x10                                                                                                                                                                               Modalities                                                                                 Assessment: Tolerated treatment fair  Prone exercises are challenging   Fatigue cont during/after exercise program   Patient would benefit from continued PT  Plan: Continue per plan of care

## 2019-03-11 ENCOUNTER — OFFICE VISIT (OUTPATIENT)
Dept: PHYSICAL THERAPY | Facility: CLINIC | Age: 79
End: 2019-03-11
Payer: MEDICARE

## 2019-03-11 DIAGNOSIS — Z98.1 S/P LUMBAR FUSION: ICD-10-CM

## 2019-03-11 DIAGNOSIS — R29.898 WEAKNESS OF BOTH LEGS: Primary | ICD-10-CM

## 2019-03-11 PROCEDURE — 97112 NEUROMUSCULAR REEDUCATION: CPT | Performed by: PHYSICAL THERAPIST

## 2019-03-11 PROCEDURE — 97110 THERAPEUTIC EXERCISES: CPT | Performed by: PHYSICAL THERAPIST

## 2019-03-11 NOTE — PROGRESS NOTES
Daily Note     Today's date: 19  Patient name: Carlos Louis  : 1940  MRN: [id number]  Referring provider: All Ahn MD  Dx:   Encounter Diagnosis     ICD-10-CM    1  Weakness of both legs R29 898    2  S/P lumbar fusion Z98 1                   Subjective: Pt reports that he feels more tired the last 2 days than normal  Objective: See treatment diary below  Precautions: s/p fusion, bilat foot drop     Daily Treatment Diary   Assist with activation of hip exs       Manuals 2/1  2/7  2/14  2/18  2/21  2/25  2/28  3/4  3/11                                                                                                     Exercise Diary                        Abdominal hollowing    10"x10    10"x20          quadruped x20     PPT    30    30               Reverse clam shell    3x10  3x10    3x10  nv  3x10  3x10  3x10     Prone hip IR knees bent    3x10 bilat  aa x10  aa x30  30  30  3x10  3x10  3x10      SLB    3' ea  3 ea    3' ea  3' ea      3' ea     Sidestep with hip IR     3'  3'      3'  3'1 pl  3'  3'     biodex catch   4'  4'  4'  hold              glute squeeze    30  30  30  30        30      prone ham curl    10 20  hold          single x10 ea      bike   6'    10'    10'  10  10'  10'      ham curls apollo - single      x2 pl 3x10  2 pl 3x10  2pl 3x10  3pl 3x10  1 pl 3x10  2pl  3x10  2 pl 3x10      bridge w/ca, march      3x10  3x10  nv  3x10  3x10  3x10  3x10      standing hip extension (with step)        g x30  yessi 3x10  blue 2x15  nv  Blue  2x15        TM          8'  -            supine LE ER            yel x30  y 3x10  y3x10  y2x30                                                                                                                                                                             Modalities                                                                                 Assessment: Tolerated treatment fair  Prone exercises are challenging   Fatigue cont during/after exercise program   Patient would benefit from continued PT  Plan: Continue per plan of care

## 2019-03-14 ENCOUNTER — OFFICE VISIT (OUTPATIENT)
Dept: PHYSICAL THERAPY | Facility: CLINIC | Age: 79
End: 2019-03-14
Payer: MEDICARE

## 2019-03-14 DIAGNOSIS — R29.898 WEAKNESS OF BOTH LEGS: Primary | ICD-10-CM

## 2019-03-14 DIAGNOSIS — Z98.1 S/P LUMBAR FUSION: ICD-10-CM

## 2019-03-14 PROCEDURE — 97140 MANUAL THERAPY 1/> REGIONS: CPT | Performed by: PHYSICAL THERAPIST

## 2019-03-14 PROCEDURE — 97110 THERAPEUTIC EXERCISES: CPT | Performed by: PHYSICAL THERAPIST

## 2019-03-14 NOTE — PROGRESS NOTES
Daily Note     Today's date: 19  Patient name: Lebron Gosselin  : 1940  MRN: [id number]  Referring provider: Ad France MD  Dx:   Encounter Diagnosis     ICD-10-CM    1  Weakness of both legs R29 898    2  S/P lumbar fusion Z98 1                   Subjective: Pt reports that he feels more tired the last 2 days than normal  Objective: See treatment diary below  Precautions: s/p fusion, bilat foot drop     Daily Treatment Diary   Assist with activation of hip exs       Manuals 2/1  2/7  2/14  2/18  2/21  2/25  2/28  3/4  3/11  3/14    psoas release:roller                    5' ea    prone quad stretch                    1 min ea                                                   Exercise Diary                        Abdominal hollowing    10"x10    10"x20          quadruped x20     PPT    30    30               Reverse clam shell    3x10  3x10    3x10  nv  3x10  3x10  3x10  20   Prone hip IR knees bent    3x10 bilat  aa x10  aa x30  30  30  3x10  3x10  3x10   3x10   SLB    3' ea  3 ea    3' ea  3' ea      3' ea     Sidestep with hip IR     3'  3'      3'  3'1 pl  3'  3'     biodex catch   4'  4'  4'  hold              glute squeeze    30  30  30  30        30      prone ham curl    10 20  hold          single x10 ea  x10    bike   6'    10'    10'  10  10'  10'      ham curls apollo - single      x2 pl 3x10  2 pl 3x10  2pl 3x10  3pl 3x10  1 pl 3x10  2pl  3x10  2 pl 3x10      bridge w/ca, march      3x10  3x10  nv  3x10  3x10  3x10  3x10      standing hip extension (with step)        g x30  yessi 3x10  blue 2x15  nv  Blue  2x15        TM          8'  -        8'    supine LE ER            yel x30  y 3x10  y3x10  y2x30                                                                                                                                                                             Modalities                                                                           noted mild hip flexor tightness  Improved ham curl after psoas release      Assessment: Tolerated treatment fair  Prone exercises are challenging  Fatigue cont during/after exercise program   Patient would benefit from continued PT  Plan: Continue per plan of care

## 2019-03-18 ENCOUNTER — OFFICE VISIT (OUTPATIENT)
Dept: PHYSICAL THERAPY | Facility: CLINIC | Age: 79
End: 2019-03-18
Payer: MEDICARE

## 2019-03-18 DIAGNOSIS — Z98.1 S/P LUMBAR FUSION: ICD-10-CM

## 2019-03-18 DIAGNOSIS — R29.898 WEAKNESS OF BOTH LEGS: Primary | ICD-10-CM

## 2019-03-18 PROCEDURE — 97110 THERAPEUTIC EXERCISES: CPT | Performed by: PHYSICAL THERAPIST

## 2019-03-18 PROCEDURE — 97140 MANUAL THERAPY 1/> REGIONS: CPT | Performed by: PHYSICAL THERAPIST

## 2019-03-18 NOTE — PROGRESS NOTES
Daily Note     Today's date: 19  Patient name: Sendy Wade  : 1940  MRN: [id number]  Referring provider: Lizeth Ac MD  Dx:   Encounter Diagnosis     ICD-10-CM    1  Weakness of both legs R29 898    2  S/P lumbar fusion Z98 1                   Subjective: Pt reports that he feels more tired the last 2 days than normal  Objective: See treatment diary below  Precautions: s/p fusion, bilat foot drop     Daily Treatment Diary   Assist with activation of hip exs       Manuals           3/18    psoas release:roller           4' ea    prone quad stretch           3 min ea                                 Exercise Diary               Abdominal hollowing              PPT              Reverse clam shell           20   Prone hip IR knees bent           3x10   SLB              Sidestep with hip IR              biodex catch               glute squeeze               prone ham curl           2x10 single    bike           10    ham curls apollo - single               bridge w/ca,  ea    standing hip extension (with step)               TM           y x30    supine LE ER                                                                                                                       Modalities                                                noted mild hip flexor tightness  Improved ham curl after psoas release      Assessment: Tolerated treatment fair  Prone exercises are challenging  Fatigue cont during/after exercise program   Patient would benefit from continued PT  Plan: Continue per plan of care

## 2019-03-21 ENCOUNTER — OFFICE VISIT (OUTPATIENT)
Dept: PHYSICAL THERAPY | Facility: CLINIC | Age: 79
End: 2019-03-21
Payer: MEDICARE

## 2019-03-21 DIAGNOSIS — R29.898 WEAKNESS OF BOTH LEGS: Primary | ICD-10-CM

## 2019-03-21 DIAGNOSIS — Z98.1 S/P LUMBAR FUSION: ICD-10-CM

## 2019-03-21 PROCEDURE — 97140 MANUAL THERAPY 1/> REGIONS: CPT | Performed by: PHYSICAL THERAPIST

## 2019-03-21 PROCEDURE — 97110 THERAPEUTIC EXERCISES: CPT | Performed by: PHYSICAL THERAPIST

## 2019-03-21 NOTE — PROGRESS NOTES
Daily Note     Today's date:   Patient name: Yung Gamez  : 1940  MRN: [id number]  Referring provider: Charline Garcia MD  Dx:   Encounter Diagnosis     ICD-10-CM    1  Weakness of both legs R29 898    2  S/P lumbar fusion Z98 1                   Subjective: no signif changes reported  Objective: See treatment diary below  Precautions: s/p fusion, bilat foot drop     Daily Treatment Diary   Assist with activation of hip exs       Manuals 3/21          3/18    psoas release:roller 4' ea          4' ea    prone quad stretch 3 min ea          3 min ea                                 Exercise Diary               Abdominal hollowing              PPT              Reverse clam shell           20   Prone hip IR knees bent 3x10          3x10   SLB              Sidestep with hip IR 12'x6             biodex catch               glute squeeze               standing ham curl 3x15 ea          2x10 single    bike           10    ham curls [place]ollo - single 2 pl 3x10              bridge w/ca,  ea    standing hip extension (with step)               TM 8'          y x30    supine LE ER               standing hip abd 3x10                                                                                                       Modalities                                                 Assessment: Tolerated treatment fair  Prone exercises are challenging  Fatigue cont during/after exercise program   Patient would benefit from continued PT  Plan: Continue per plan of care

## 2019-03-25 ENCOUNTER — OFFICE VISIT (OUTPATIENT)
Dept: PHYSICAL THERAPY | Facility: CLINIC | Age: 79
End: 2019-03-25
Payer: MEDICARE

## 2019-03-25 DIAGNOSIS — Z98.1 S/P LUMBAR FUSION: ICD-10-CM

## 2019-03-25 DIAGNOSIS — R29.898 WEAKNESS OF BOTH LEGS: Primary | ICD-10-CM

## 2019-03-25 PROCEDURE — 97112 NEUROMUSCULAR REEDUCATION: CPT | Performed by: PHYSICAL THERAPIST

## 2019-03-25 PROCEDURE — 97530 THERAPEUTIC ACTIVITIES: CPT | Performed by: PHYSICAL THERAPIST

## 2019-03-25 PROCEDURE — 97110 THERAPEUTIC EXERCISES: CPT | Performed by: PHYSICAL THERAPIST

## 2019-03-25 NOTE — PROGRESS NOTES
Daily Note     Today's date:   Patient name: Nabeel Agrawal  : 1940  MRN: [id number]  Referring provider: Jodi Pinedo MD  Dx:   Encounter Diagnosis     ICD-10-CM    1  Weakness of both legs R29 898    2  S/P lumbar fusion Z98 1                   Subjective: no signif changes reported  Objective: See treatment diary below  Precautions: s/p fusion, bilat foot drop     Daily Treatment Diary   Assist with activation of hip exs       Manuals 3/25          3/18    psoas release:roller declined          4' ea    prone quad stretch 3 min ea          3 min ea                                 Exercise Diary               Abdominal hollowing              PPT              Reverse clam shell Seated red 2x30          20   Prone hip IR knees bent 3x10          3x10   SLB              Sidestep with hip IR 12'x8             biodex catch               glute squeeze               standing ham curl 3x15 ea          2x10 single    bike           10    ham curls [place] - single 2 pl 3x10              bridge w/ca,           30 ea    standing hip extension (with step)               TM 10'          y x30    supine LE ER               standing hip abd 3x10              march                                                                                         Modalities                                                 Assessment: Tolerated treatment fair  Prone exercises are challenging  Fatigue cont during/after exercise program   Patient would benefit from continued PT  Plan: Continue per plan of care

## 2019-03-28 ENCOUNTER — OFFICE VISIT (OUTPATIENT)
Dept: PHYSICAL THERAPY | Facility: CLINIC | Age: 79
End: 2019-03-28
Payer: MEDICARE

## 2019-03-28 DIAGNOSIS — Z98.1 S/P LUMBAR FUSION: ICD-10-CM

## 2019-03-28 DIAGNOSIS — R29.898 WEAKNESS OF BOTH LEGS: Primary | ICD-10-CM

## 2019-03-28 PROCEDURE — 97530 THERAPEUTIC ACTIVITIES: CPT | Performed by: PHYSICAL THERAPIST

## 2019-03-28 PROCEDURE — 97110 THERAPEUTIC EXERCISES: CPT | Performed by: PHYSICAL THERAPIST

## 2019-03-28 NOTE — PROGRESS NOTES
DC summary    Today's date:   Patient name: Sary Irizarry  : 1940  MRN: [id number]  Referring provider: Renee Dale MD  Dx:   Encounter Diagnosis     ICD-10-CM    1  Weakness of both legs R29 898    2  S/P lumbar fusion Z98 1                   Subjective: no signif changes reported  Pain: 0/10  Objective: See treatment diary below  Precautions: s/p fusion, bilat foot drop     Daily Treatment Diary      Manuals 3/28             psoas release:roller declined             prone quad stretch 3 min ea                                        Exercise Diary              Abdominal hollowing             PPT             Reverse clam shell Seated red 2x30            Prone hip IR knees bent 3x10            SLB             Sidestep with hip IR 12'x8            biodex catch              glute squeeze              standing ham curl 3x15 ea             bike              ham curls [place]ollo - single 2 pl 3x10 3 pl 3x10            bridge w/ca, march 30             standing hip extension (with step)              TM 10'             supine LE ER              standing hip abd 3x10             march              leg press single 1 pl 3x10 ea                                                                    Modalities                                          able to tolerate leg press w/o knee pain  Able to negotiate stairs with use of one railing and SPC    Goals  Impairment Goals  - Increase strength - partially met    Functional Goals  - Return to Prior Level of Function - partially met  - Increase Functional Status Measure  - Negotiate one flight of stairs with minimal use of railing - partially met  - Patient will be independent with HEP - met      Assessment: max benefit has been reached from skilled PT    Plan: DC to supervised fitness

## 2019-05-20 ENCOUNTER — TELEPHONE (OUTPATIENT)
Dept: NEUROSURGERY | Facility: CLINIC | Age: 79
End: 2019-05-20

## 2019-06-11 ENCOUNTER — APPOINTMENT (OUTPATIENT)
Dept: RADIOLOGY | Facility: CLINIC | Age: 79
End: 2019-06-11
Payer: MEDICARE

## 2019-06-11 DIAGNOSIS — Z98.1 STATUS POST LUMBAR SPINAL FUSION: ICD-10-CM

## 2019-06-11 PROCEDURE — 72120 X-RAY BEND ONLY L-S SPINE: CPT

## 2019-06-25 ENCOUNTER — TRANSCRIBE ORDERS (OUTPATIENT)
Dept: NEUROSURGERY | Facility: CLINIC | Age: 79
End: 2019-06-25

## 2019-06-25 ENCOUNTER — OFFICE VISIT (OUTPATIENT)
Dept: NEUROSURGERY | Facility: CLINIC | Age: 79
End: 2019-06-25
Payer: MEDICARE

## 2019-06-25 VITALS
RESPIRATION RATE: 16 BRPM | DIASTOLIC BLOOD PRESSURE: 82 MMHG | HEIGHT: 69 IN | TEMPERATURE: 96.9 F | BODY MASS INDEX: 30.04 KG/M2 | HEART RATE: 61 BPM | SYSTOLIC BLOOD PRESSURE: 147 MMHG | WEIGHT: 202.8 LBS

## 2019-06-25 DIAGNOSIS — M21.372 FOOT DROP, BILATERAL: ICD-10-CM

## 2019-06-25 DIAGNOSIS — R26.9 UNSPECIFIED ABNORMALITIES OF GAIT AND MOBILITY: ICD-10-CM

## 2019-06-25 DIAGNOSIS — Z98.1 STATUS POST LUMBAR SPINAL FUSION: Primary | ICD-10-CM

## 2019-06-25 DIAGNOSIS — M21.371 FOOT DROP, BILATERAL: ICD-10-CM

## 2019-06-25 PROCEDURE — 99214 OFFICE O/P EST MOD 30 MIN: CPT | Performed by: NEUROLOGICAL SURGERY

## 2019-10-02 ENCOUNTER — APPOINTMENT (OUTPATIENT)
Dept: RADIOLOGY | Facility: CLINIC | Age: 79
End: 2019-10-02
Payer: MEDICARE

## 2019-10-02 ENCOUNTER — TRANSCRIBE ORDERS (OUTPATIENT)
Dept: ADMINISTRATIVE | Facility: HOSPITAL | Age: 79
End: 2019-10-02

## 2019-10-02 DIAGNOSIS — M17.0 OSTEOARTHRITIS OF BOTH KNEES, UNSPECIFIED OSTEOARTHRITIS TYPE: ICD-10-CM

## 2019-10-02 DIAGNOSIS — M17.0 OSTEOARTHRITIS OF BOTH KNEES, UNSPECIFIED OSTEOARTHRITIS TYPE: Primary | ICD-10-CM

## 2019-10-02 PROCEDURE — 73560 X-RAY EXAM OF KNEE 1 OR 2: CPT

## 2019-12-03 ENCOUNTER — EVALUATION (OUTPATIENT)
Dept: PHYSICAL THERAPY | Facility: CLINIC | Age: 79
End: 2019-12-03
Payer: MEDICARE

## 2019-12-03 DIAGNOSIS — R29.898 WEAKNESS OF BOTH LEGS: Primary | ICD-10-CM

## 2019-12-03 PROCEDURE — 97161 PT EVAL LOW COMPLEX 20 MIN: CPT | Performed by: PHYSICAL THERAPIST

## 2019-12-03 NOTE — LETTER
2019    Libertad Pendleton MD   [employer] [employer]  [place]    Patient: Elijah Samuels   YOB: 1940   Date of Visit: 12/3/2019     Encounter Diagnosis     ICD-10-CM    1  Weakness of both legs R29 898        Dear Dr Adelita Pemberton:    Thank you for your recent referral of Elijah Samuels  Please review the attached evaluation summary from Ghada's recent visit  Please verify that you agree with the plan of care by signing the attached order  If you have any questions or concerns, please do not hesitate to call  I sincerely appreciate the opportunity to share in the care of one of your patients and hope to have another opportunity to work with you in the near future  Sincerely,    Lindsay Monzon, PT      Referring Provider:      I certify that I have read the below Plan of Care and certify the need for these services furnished under this plan of treatment while under my care  Libertad Pendleton MD   [employer] [employer]  [place]  VIA Facsimile: [phone]          PT Evaluation     Today's date: 12/3/2019  Patient name: Elijah Samuels  : 1940  MRN: [id number]  Referring provider: Anshu Wilkins MD  Dx:   Encounter Diagnosis     ICD-10-CM    1  Weakness of both legs R29 898                   Assessment  Assessment details: Patient is a [de-identified] y o  male who presents to outpatient PT with  decreased rom, decreased strength, poor balance and decreased functional mobility  He will benefit from skilled PT to address these deficits in order to achieve his goals and maximize his functional mobility  Understanding of Dx/Px/POC: excellent   Prognosis: fair    Goals  Short Term Goals: Independent performance of initial hep  Decrease pain 2 points on VAS      Long Term Goals:   Independent performance of comprehensive hep    Performance of IADL's is returned to max level of function  Performance in recreational activities is improved to max level of function  Able to walk community distances with [address] to climb stairs with reciprocal gait pattern and single rail  Plan  Planned therapy interventions: IADL retraining, manual therapy, abdominal trunk stabilization, stretching, strengthening, therapeutic activities, therapeutic exercise, gait training, transfer training and home exercise program  Frequency: 2x week  Duration in weeks: 6        Subjective Evaluation    History of Present Illness  Mechanism of injury: Pt has history of lumbar surgery but that he has been dealing to LE weakness for the past year and he feels that this is getting worse  Has been tx with PT earlier in the year but reports non-compliance with his hep  Currently ambulates with a SPC  Also utilizes B/L AFO's secondary to foot drop  Reports that he is able to walk approx 60 feet prior to fatigue  Reports that he has difficulty climbing stairs secondary to "to weak to push myself up"  Reports that he has difficulty with sit to stand transfers and requires UE support to perform this          History of L knee OA  Pain  Current pain ratin  At best pain ratin  At worst pain ratin    Patient Goals  Patient goals for therapy: improved balance, increased strength and independence with ADLs/IADLs          Objective    Ambulates with uneven step length and width  TU seconds      Strength:  Hip:  Flex: 4-/5  Ext 4-/5  Abd: 3/5  Add: 4/5        Knee:   Flex 4/5  Ext 4/5      Ankle   DF: 2/5  PF: 3/  Inv: 3/5  Ev: 3/5           Precautions: B/L LE weakness, fall risk      Manual                                                                                   Exercise Diary              bike             ltr             TaA bridge             TaA bridge with clamshell             TaA marching             Standing hip 4 way             Hr             Step ups             Leg press             sidestepping             Lateral step up and over                                                                                                                                      Modalities

## 2019-12-03 NOTE — PROGRESS NOTES
PT Evaluation     Today's date: 12/3/2019  Patient name: Madison Sanz  : 1940  MRN: [id number]  Referring provider: Laron Jimenez MD  Dx:   Encounter Diagnosis     ICD-10-CM    1  Weakness of both legs R29 898                   Assessment  Assessment details: Patient is a [de-identified] y o  male who presents to outpatient PT with  decreased rom, decreased strength, poor balance and decreased functional mobility  He will benefit from skilled PT to address these deficits in order to achieve his goals and maximize his functional mobility  Understanding of Dx/Px/POC: excellent   Prognosis: fair    Goals  Short Term Goals: Independent performance of initial hep  Decrease pain 2 points on VAS      Long Term Goals: Independent performance of comprehensive hep    Performance of IADL's is returned to max level of function  Performance in recreational activities is improved to max level of function  Able to walk community distances with [address] to climb stairs with reciprocal gait pattern and single rail  Plan  Planned therapy interventions: IADL retraining, manual therapy, abdominal trunk stabilization, stretching, strengthening, therapeutic activities, therapeutic exercise, gait training, transfer training and home exercise program  Frequency: 2x week  Duration in weeks: 6        Subjective Evaluation    History of Present Illness  Mechanism of injury: Pt has history of lumbar surgery but that he has been dealing to LE weakness for the past year and he feels that this is getting worse  Has been tx with PT earlier in the year but reports non-compliance with his hep  Currently ambulates with a SPC  Also utilizes B/L AFO's secondary to foot drop  Reports that he is able to walk approx 60 feet prior to fatigue  Reports that he has difficulty climbing stairs secondary to "to weak to push myself up"  Reports that he has difficulty with sit to stand transfers and requires UE support to perform this  History of L knee OA  Pain  Current pain ratin  At best pain ratin  At worst pain ratin    Patient Goals  Patient goals for therapy: improved balance, increased strength and independence with ADLs/IADLs          Objective    Ambulates with uneven step length and width  TU seconds      Strength:  Hip:  Flex: 4-/5  Ext 4-/5  Abd: 3/5  Add:         Knee:   Flex   Ext       Ankle   DF:   PF: 3/4  Inv: 3/5  Ev: 3/5           Precautions: B/L LE weakness, fall risk      Manual                                                                                   Exercise Diary              bike             ltr             TaA bridge             TaA bridge with clamshell             TaA marching             Standing hip 4 way             Hr             Step ups             Leg press             sidestepping             Lateral step up and over                                                                                                                                      Modalities

## 2019-12-04 ENCOUNTER — TRANSCRIBE ORDERS (OUTPATIENT)
Dept: PHYSICAL THERAPY | Facility: CLINIC | Age: 79
End: 2019-12-04

## 2019-12-04 DIAGNOSIS — R29.898 WEAKNESS OF BOTH LEGS: Primary | ICD-10-CM

## 2019-12-06 ENCOUNTER — OFFICE VISIT (OUTPATIENT)
Dept: PHYSICAL THERAPY | Facility: CLINIC | Age: 79
End: 2019-12-06
Payer: MEDICARE

## 2019-12-06 DIAGNOSIS — R29.898 WEAKNESS OF BOTH LEGS: Primary | ICD-10-CM

## 2019-12-06 PROCEDURE — 97110 THERAPEUTIC EXERCISES: CPT | Performed by: PHYSICAL THERAPIST

## 2019-12-06 PROCEDURE — 97112 NEUROMUSCULAR REEDUCATION: CPT | Performed by: PHYSICAL THERAPIST

## 2019-12-06 NOTE — PROGRESS NOTES
Daily Note     Today's date: 2019  Patient name: Adama Raymond  : 1940  MRN: [id number]  Referring provider: Fouzia Mccann MD  Dx:   Encounter Diagnosis     ICD-10-CM    1  Weakness of both legs R29 898        Start Time: 1430  Stop Time: 1515  Total time in clinic (min): 45 minutes    Subjective: Patient reports no issues after IE  Objective: See treatment diary below      Assessment: Patient tolerated treatment well  He required assistance from UE to complete seated heel raises on LLE  Required tactile cuing to limit rotation, verbal cuing to stand up straight during standing SLR, and verbal cuing to control lowering phase of bridges  Able to maintain all corrections after cuing  He will benefit from continued physical therapy to progress LE strength and improve functional level  Plan: Continue per plan of care        Precautions: B/L LE weakness, fall risk      Manual                                                                                   Exercise Diary              bike 8'            ltr 5"x15 ea            TaA bridge 2x10 ea            TaA bridge with clamshell             TaA marching 2x10            Standing hip 4 way 3 way 20x ea            Hr seated, 20x ea  (R self assist)            Step ups 4" 10x ea            Leg press 10# 2x10            sidestepping 4 laps            Lateral step up and over             clamshells 2x10 ea                                                                                                                        Modalities

## 2019-12-09 ENCOUNTER — OFFICE VISIT (OUTPATIENT)
Dept: PHYSICAL THERAPY | Facility: CLINIC | Age: 79
End: 2019-12-09
Payer: MEDICARE

## 2019-12-09 DIAGNOSIS — R29.898 WEAKNESS OF BOTH LEGS: Primary | ICD-10-CM

## 2019-12-09 PROCEDURE — 97112 NEUROMUSCULAR REEDUCATION: CPT

## 2019-12-09 PROCEDURE — 97110 THERAPEUTIC EXERCISES: CPT

## 2019-12-09 NOTE — PROGRESS NOTES
Daily Note     Today's date: 2019  Patient name: Ifrah Steevns  : 1940  MRN: [id number]  Referring provider: Author Soila MD  Dx:   Encounter Diagnosis     ICD-10-CM    1  Weakness of both legs R29 898                 Subjective: Patient complains of continued weakness in bilateral lower extremities  Objective: See treatment diary below  Assessment: Patient significantly fatigued with increase in weight on the leg press machine  Patient will benefit from continued PT to improve bilateral lower extremity strength  Plan: Continue treatment as per PT plan of care       Precautions: B/L LE weakness, fall risk    Manual                                                                                  Exercise Diary             bike 8' 8'           ltr 5"x15 ea 5"x15           TaA bridge 2x10 ea 2x10           TaA bridge with clamshell             TaA marching 2x10 supine  2x10           Standing hip 4 way 3 way 20x ea 3 way  20 ea           Hr seated, 20x ea  (R self assist) seated  20           Step ups 4" 10x ea 4"  15 ea           Leg press 10# 2x10 20#  2x10           sidestepping 4 laps 4 laps           Lateral step up and over  4 inch  10           clamshells 2x10 ea blue  2x10           toe raise  supine strap assist  20 ea                                                                                                          Modalities

## 2019-12-13 ENCOUNTER — OFFICE VISIT (OUTPATIENT)
Dept: PHYSICAL THERAPY | Facility: CLINIC | Age: 79
End: 2019-12-13
Payer: MEDICARE

## 2019-12-13 DIAGNOSIS — R29.898 WEAKNESS OF BOTH LEGS: Primary | ICD-10-CM

## 2019-12-13 PROCEDURE — 97110 THERAPEUTIC EXERCISES: CPT

## 2019-12-13 PROCEDURE — 97112 NEUROMUSCULAR REEDUCATION: CPT

## 2019-12-13 NOTE — PROGRESS NOTES
Daily Note     Today's date: 2019  Patient name: Chris Taylor  : 1940  MRN: [id number]  Referring provider: Zoran Irwin MD  Dx:   Encounter Diagnosis     ICD-10-CM    1  Weakness of both legs R29 898                 Subjective: Patient complains bilateral lower extremities felt very fatigued after the last PT treatment  Objective: See treatment diary below  Assessment: Fatigue continues throughout performance of therapeutic exercise program - therefore decreased repetitions for step ups and did not perform leg press  Plan: Continue treatment as per PT plan of care       Precautions: B/L LE weakness, fall risk    Manual                                                                                 Exercise Diary            bike 8' 8' 8'          ltr 5"x15 ea 5"x15 5"x15          TaA bridge 2x10 ea 2x10 2x10          TaA bridge with clamshell             TaA marching 2x10 supine  2x10 supine  2x10          Standing hip 4 way 3 way 20x ea 3 way  20 ea 3 way  20 ea          Hr seated, 20x ea  (R self assist) seated  20 seated  20          Step ups 4" 10x ea 4"  15 ea 4"  1x10 ea          Leg press 10# 2x10 20#  2x10 NP          sidestepping 4 laps 4 laps 4 laps          Lateral step up and over  4 inch  10 4 inch  1x12          clamshells 2x10 ea blue  2x10 blue  2x10          toe raise  supine strap assist  20 ea supine strap assist  20 ea                                                                                                         Modalities

## 2019-12-17 ENCOUNTER — OFFICE VISIT (OUTPATIENT)
Dept: PHYSICAL THERAPY | Facility: CLINIC | Age: 79
End: 2019-12-17
Payer: MEDICARE

## 2019-12-17 DIAGNOSIS — R29.898 WEAKNESS OF BOTH LEGS: Primary | ICD-10-CM

## 2019-12-17 PROCEDURE — 97110 THERAPEUTIC EXERCISES: CPT

## 2019-12-17 PROCEDURE — 97112 NEUROMUSCULAR REEDUCATION: CPT

## 2019-12-17 NOTE — PROGRESS NOTES
Daily Note     Today's date: 2019  Patient name: Clara Randhawa  : 1940  MRN: [id number]  Referring provider: Jj Malone MD  Dx:   Encounter Diagnosis     ICD-10-CM    1  Weakness of both legs R29 898                 Subjective: No significant changes to report since the last PT treatment  Objective: See treatment diary below  Assessment: Bilateral lower extremity fatigue noted when performing additional hamstring strengthening exercise  Plan: Continue treatment as per PT plan of care       Precautions: B/L LE weakness, fall risk    Manual                                                                                Exercise Diary           bike 8' 8' 8' NP         ltr 5"x15 ea 5"x15 5"x15 5"x15         TaA bridge 2x10 ea 2x10 2x10 2x10         TaA bridge with clamshell             TaA marching 2x10 supine  2x10 supine  2x10 supine 2x10         Standing hip 4 way 3 way 20x ea 3 way  20 ea 3 way  20 ea 3 way  20 ea         Hr seated, 20x ea  (R self assist) seated  20 seated  20 seated  20         Step ups 4" 10x ea 4"  15 ea 4"  1x10 ea 4"  1x10 ea         Leg press 10# 2x10 20#  2x10 NP 10#  2x10         sidestepping 4 laps 4 laps 4 laps 4 laps         Lateral step up and over  4 inch  10 4 inch  1x12 4"  1x10         clamshells 2x10 ea blue  2x10 blue  2x10 blue  2x10         toe raise  supine strap assist  20 ea supine strap assist  20 ea supine strap assist  20 ea         seated knee flexion machine    20#  2x10                                                                                           Modalities

## 2019-12-20 ENCOUNTER — APPOINTMENT (OUTPATIENT)
Dept: PHYSICAL THERAPY | Facility: CLINIC | Age: 79
End: 2019-12-20
Payer: MEDICARE

## 2019-12-20 ENCOUNTER — APPOINTMENT (OUTPATIENT)
Dept: RADIOLOGY | Facility: CLINIC | Age: 79
End: 2019-12-20
Payer: MEDICARE

## 2019-12-20 DIAGNOSIS — Z98.1 STATUS POST LUMBAR SPINAL FUSION: ICD-10-CM

## 2019-12-20 PROCEDURE — 72100 X-RAY EXAM L-S SPINE 2/3 VWS: CPT

## 2019-12-23 ENCOUNTER — OFFICE VISIT (OUTPATIENT)
Dept: PHYSICAL THERAPY | Facility: CLINIC | Age: 79
End: 2019-12-23
Payer: MEDICARE

## 2019-12-23 DIAGNOSIS — R29.898 WEAKNESS OF BOTH LEGS: Primary | ICD-10-CM

## 2019-12-23 PROCEDURE — 97110 THERAPEUTIC EXERCISES: CPT

## 2019-12-23 PROCEDURE — 97112 NEUROMUSCULAR REEDUCATION: CPT

## 2019-12-23 NOTE — PROGRESS NOTES
Daily Note     Today's date: 2019  Patient name: Veronica Davidson  : 1940  MRN: [id number]  Referring provider: Deepti Messer MD  Dx:   Encounter Diagnosis     ICD-10-CM    1  Weakness of both legs R29 898                 Subjective: No significant changes to report since the last PT visit  Objective: See treatment diary below  Assessment: Bilateral lower extremity fatigue noted throughout therapeutic exercise program     Plan: Continue treatment as per PT plan of care       Precautions: B/L LE weakness, fall risk    Manual                                                                               Exercise Diary          bike 8' 8' 8' NP NP        ltr 5"x15 ea 5"x15 5"x15 5"x15 5"x15        TaA bridge 2x10 ea 2x10 2x10 2x10 2x10        TaA bridge with clamshell             TaA marching 2x10 supine  2x10 supine  2x10 supine 2x10 NP        Standing hip 4 way 3 way 20x ea 3 way  20 ea 3 way  20 ea 3 way  20 ea 3 way  20 ea        Hr seated, 20x ea  (R self assist) seated  20 seated  20 seated  20 NP        Step ups 4" 10x ea 4"  15 ea 4"  1x10 ea 4"  1x10 ea 4"  1x10 ea        Leg press 10# 2x10 20#  2x10 NP 10#  2x10 10#  2x10        sidestepping 4 laps 4 laps 4 laps 4 laps 4 laps        Lateral step up and over  4 inch  10 4 inch  1x12 4"  1x10 4"  1x10        clamshells 2x10 ea blue  2x10 blue  2x10 blue  2x10 blue  2x10        toe raise  supine strap assist  20 ea supine strap assist  20 ea supine strap assist  20 ea supine  strap assist  20 ea        seated knee flexion machine    20#  2x10 20#  2x10        treadmill     8'                                                                             Modalities

## 2019-12-26 ENCOUNTER — OFFICE VISIT (OUTPATIENT)
Dept: PHYSICAL THERAPY | Facility: CLINIC | Age: 79
End: 2019-12-26
Payer: MEDICARE

## 2019-12-26 DIAGNOSIS — R29.898 WEAKNESS OF BOTH LEGS: Primary | ICD-10-CM

## 2019-12-26 PROCEDURE — 97112 NEUROMUSCULAR REEDUCATION: CPT

## 2019-12-26 PROCEDURE — 97110 THERAPEUTIC EXERCISES: CPT

## 2019-12-26 NOTE — PROGRESS NOTES
Daily Note     Today's date: 2019  Patient name: Heena Stearns  : 1940  MRN: [id number]  Referring provider: Charles Pollock MD  Dx:   Encounter Diagnosis     ICD-10-CM    1  Weakness of both legs R29 898                 Subjective: Patient reports bilateral lower extremities are feeling very weak this morning - reports he was on his feet a lot yesterday  Objective: See treatment diary below  Assessment: Bilateral lower extremity fatigue noted after warm up on the stationary bike  Increased difficulty performing step ups leading with the left lower extremity therefore performed these on the 2 inch step versus the 4 inch step  Plan: Continue treatment as per PT plan of care       Precautions: B/L LE weakness, fall risk    Manual                                                                              Exercise Diary         bike 8' 8' 8' NP NP 10'       ltr 5"x15 ea 5"x15 5"x15 5"x15 5"x15 NP       TaA bridge 2x10 ea 2x10 2x10 2x10 2x10 2x10       TaA bridge with clamshell             TaA marching 2x10 supine  2x10 supine  2x10 supine 2x10 NP supine  2x10       Standing hip 4 way 3 way 20x ea 3 way  20 ea 3 way  20 ea 3 way  20 ea 3 way  20 ea 3 way  20 ea       Hr seated, 20x ea  (R self assist) seated  20 seated  20 seated  20 NP NP       Step ups 4" 10x ea 4"  15 ea 4"  1x10 ea 4"  1x10 ea 4"  1x10 ea 4" R  2" L  10 ea       Leg press 10# 2x10 20#  2x10 NP 10#  2x10 10#  2x10 NP       sidestepping 4 laps 4 laps 4 laps 4 laps 4 laps 4  laps       Lateral step up and over  4 inch  10 4 inch  1x12 4"  1x10 4"  1x10 NP       clamshells 2x10 ea blue  2x10 blue  2x10 blue  2x10 blue  2x10 blue  2x10       toe raise  supine strap assist  20 ea supine strap assist  20 ea supine strap assist  20 ea supine  strap assist  20 ea supine  strap assist  20 ea       seated knee flexion machine    20#  2x10 20#  2x10 20#  2x10 treadmill     8' NP                                                                            Modalities

## 2019-12-30 ENCOUNTER — OFFICE VISIT (OUTPATIENT)
Dept: PHYSICAL THERAPY | Facility: CLINIC | Age: 79
End: 2019-12-30
Payer: MEDICARE

## 2019-12-30 DIAGNOSIS — R29.898 WEAKNESS OF BOTH LEGS: Primary | ICD-10-CM

## 2019-12-30 PROCEDURE — 97110 THERAPEUTIC EXERCISES: CPT

## 2019-12-30 NOTE — PROGRESS NOTES
Daily Note     Today's date: 2019  Patient name: Josseline Huston  : 1940  MRN: [id number]  Referring provider: Terrence Sanchez MD  Dx:   Encounter Diagnosis     ICD-10-CM    1  Weakness of both legs R29 898                 Subjective: No significant changes to report since the last PT visit  Objective: See treatment diary below  Assessment: Patient with improved tolerance to performing step ups today, especially leading with the left lower extremity  Patient complains of bilateral lower extremity fatigue when performing hamstring curls  Plan: Continue treatment as per PT plan of care       Precautions: B/L LE weakness, fall risk    Manual                                                                             Exercise Diary        bike 8' 8' 8' NP NP 10' 8'      ltr 5"x15 ea 5"x15 5"x15 5"x15 5"x15 NP NP      TaA bridge 2x10 ea 2x10 2x10 2x10 2x10 2x10 2x10      TaA bridge with clamshell             TaA marching 2x10 supine  2x10 supine  2x10 supine 2x10 NP supine  2x10 NP      Standing hip 4 way 3 way 20x ea 3 way  20 ea 3 way  20 ea 3 way  20 ea 3 way  20 ea 3 way  20 ea 3 way  20 ea      Hr seated, 20x ea  (R self assist) seated  20 seated  20 seated  20 NP NP NP      Step ups 4" 10x ea 4"  15 ea 4"  1x10 ea 4"  1x10 ea 4"  1x10 ea 4" R  2" L  10 ea 4"  10 ea      Leg press 10# 2x10 20#  2x10 NP 10#  2x10 10#  2x10 NP NP      sidestepping 4 laps 4 laps 4 laps 4 laps 4 laps 4  laps 4 laps      Lateral step up and over  4 inch  10 4 inch  1x12 4"  1x10 4"  1x10 NP 4"  1x10      clamshells 2x10 ea blue  2x10 blue  2x10 blue  2x10 blue  2x10 blue  2x10 blue  2x10      toe raise  supine strap assist  20 ea supine strap assist  20 ea supine strap assist  20 ea supine  strap assist  20 ea supine  strap assist  20 ea supine  strap assist  20 ea      seated knee flexion machine    20#  2x10 20#  2x10 20#  2x10 20#  2x10      treadmill     8' NP NP                                                                           Modalities

## 2019-12-31 ENCOUNTER — OFFICE VISIT (OUTPATIENT)
Dept: NEUROSURGERY | Facility: CLINIC | Age: 79
End: 2019-12-31
Payer: MEDICARE

## 2019-12-31 VITALS
SYSTOLIC BLOOD PRESSURE: 148 MMHG | TEMPERATURE: 97.3 F | HEART RATE: 77 BPM | HEIGHT: 69 IN | WEIGHT: 200 LBS | DIASTOLIC BLOOD PRESSURE: 80 MMHG | BODY MASS INDEX: 29.62 KG/M2

## 2019-12-31 DIAGNOSIS — M21.372 FOOT DROP, BILATERAL: ICD-10-CM

## 2019-12-31 DIAGNOSIS — Z98.1 STATUS POST LUMBAR SPINAL FUSION: Primary | ICD-10-CM

## 2019-12-31 DIAGNOSIS — M21.371 FOOT DROP, BILATERAL: ICD-10-CM

## 2019-12-31 DIAGNOSIS — M21.372 FOOT DROP, LEFT: ICD-10-CM

## 2019-12-31 PROCEDURE — 99213 OFFICE O/P EST LOW 20 MIN: CPT | Performed by: NEUROLOGICAL SURGERY

## 2019-12-31 NOTE — PROGRESS NOTES
Office Note - Neurosurgery   Veronica Davidson [de-identified] y o  male MRN: [id number]      Assessment:    Patient is stable  [de-identified]year-old gentleman approximately 1 year post lumbar decompression and fusion  He continues to have bilateral foot drops and his gait appears to be stable, though he would likely be more confident in his gait and more efficient biomechanically if he wore firm AFOs  Imaging is stable  He will follow up through this office on a p r n  Basis  History, physical examination and diagnostic tests were reviewed and questions answered  Diagnosis, care plan and treatment options were discussed  The patient and spouse/SO understand instructions and will follow up as directed  Plan:    Follow-up: prn    Problem List Items Addressed This Visit        Other    Foot drop, left    Foot drop, bilateral    Status post lumbar spinal fusion - Primary          Subjective/Objective     Chief Complaint    Gait difficulties  HPI    Approximately 1 year post lumbar decompression and fusion  He denies any lower back pain or pain in his legs  He has ongoing with gait difficulties secondary to bilateral foot drop and he prefers to wear a flexible AFO  ROS  ROS personally reviewed and updated  Review of Systems   Constitutional: Negative  HENT: Negative  Eyes: Negative  Respiratory: Negative  Cardiovascular: Negative  Gastrointestinal: Negative  Endocrine: Negative  Genitourinary: Negative  Musculoskeletal: Positive for gait problem (muñoz assistance, bilateral brace)  Skin: Negative  Allergic/Immunologic: Negative  Neurological: Positive for weakness (bilateral leg) and numbness (bilateral feet cold sensation)  Hematological: Negative  Psychiatric/Behavioral: Negative  Family History    History reviewed  No pertinent family history      Social History    Social History     Socioeconomic History    Marital status: /Civil Union     Spouse name: Not on file  Number of children: Not on file    Years of education: Not on file    Highest education level: Not on file   Occupational History    Not on file   Social Needs    Financial resource strain: Not on file    Food insecurity:     Worry: Not on file     Inability: Not on file    Transportation needs:     Medical: Not on file     Non-medical: Not on file   Tobacco Use    Smoking status: Former Smoker    Smokeless tobacco: Never Used   Substance and Sexual Activity    Alcohol use: Yes     Comment: MOD    Drug use: No    Sexual activity: Never   Lifestyle    Physical activity:     Days per week: Not on file     Minutes per session: Not on file    Stress: Not on file   Relationships    Social connections:     Talks on phone: Not on file     Gets together: Not on file     Attends Confucianist service: Not on file     Active member of club or organization: Not on file     Attends meetings of clubs or organizations: Not on file     Relationship status: Not on file    Intimate partner violence:     Fear of current or ex partner: Not on file     Emotionally abused: Not on file     Physically abused: Not on file     Forced sexual activity: Not on file   Other Topics Concern    Not on file   Social History Narrative    Not on file       Past Medical History    Past Medical History:   Diagnosis Date    Diphtheria     History of chicken pox     Kidney stones     Lumbar stenosis     Measles        Surgical History    Past Surgical History:   Procedure Laterality Date    KIDNEY STONE SURGERY      WI ARTHRODESIS POSTERIOR/POSTEROLATERAL LUMBAR Bilateral 12/10/2018    Procedure: L4-5 posterior decompression with instrumented fixation fusion; transforaminal lumbar interbody fusion;  Surgeon: Perfecto Saenz MD;  Location: [facility];  Service: Neurosurgery       Medications      Current Outpatient Medications:     acetaminophen (TYLENOL) 500 mg tablet, Take 1,000 mg by mouth every 6 (six) hours as needed, Disp: , Rfl:     cholecalciferol (VITAMIN D3) 400 units tablet, Take 400 Units by mouth daily, Disp: , Rfl:     Cyanocobalamin (VITAMIN B-12) 5000 MCG TBDP, Take by mouth daily, Disp: , Rfl:     Allergies    Allergies   Allergen Reactions    Iodine Rash       The following portions of the patient's history were reviewed and updated as appropriate: allergies, current medications, past family history, past medical history, past social history, past surgical history and problem list     Investigations    I personally reviewed the XRAY results with the patient:    Upright plain film lumbar spine dated December 20th, 2019  Comparison to previous imaging  Stable appearance of L4-5 instrumented fixation fusion without evidence of hardware loosening or failure  Overall stable degenerative changes throughout the lumbar spine  Physical Exam    Vitals:  Blood pressure 148/80, pulse 77, temperature (!) 97 3 °F (36 3 °C), temperature source Tympanic, height 5' 9" (1 753 m), weight 90 7 kg (200 lb)  ,Body mass index is 29 53 kg/m²  Physical Exam   Constitutional: He is oriented to person, place, and time  He appears well-developed and well-nourished  No distress  Eyes: EOM are normal    Pulmonary/Chest: Effort normal  No respiratory distress  Neurological: He is alert and oriented to person, place, and time  Walks with a mildly wide-based steppage gait to compensate for bilateral footdrop  Skin: Skin is warm and dry  Psychiatric: He has a normal mood and affect  His behavior is normal    Vitals reviewed  Neurologic Exam     Mental Status   Oriented to person, place, and time       Cranial Nerves     CN III, IV, VI   Extraocular motions are normal

## 2020-01-03 ENCOUNTER — OFFICE VISIT (OUTPATIENT)
Dept: PHYSICAL THERAPY | Facility: CLINIC | Age: 80
End: 2020-01-03
Payer: MEDICARE

## 2020-01-03 DIAGNOSIS — R29.898 WEAKNESS OF BOTH LEGS: Primary | ICD-10-CM

## 2020-01-03 PROCEDURE — 97110 THERAPEUTIC EXERCISES: CPT

## 2020-01-03 NOTE — PROGRESS NOTES
Daily Note     Today's date: 1/3/2020  Patient name: Elijah Samuels  : 1940  MRN: [id number]  Referring provider: Anshu Wilkins MD  Dx:   Encounter Diagnosis     ICD-10-CM    1  Weakness of both legs R29 898                   Subjective: Patient reports he followed up with the surgeon on Tuesday - "He said it's about as good as it's going to get "  Patient reports it was suggested to him that he wear more firm AFOs  Patient reports he would like to try acupuncture  Objective: See treatment diary below  Assessment: Fatigue noted throughout therapeutic exercise program     Plan: Continue treatment as per PT plan of care         Precautions: B/L LE weakness, fall risk    Manual  12/6 12/9 12/13 12/17 12/23 12/26 12/30 1/3                                                Exercise Diary  12/6 12/9 12/13 12/17 12/23 12/26 12/30 1/3     bike 8' 8' 8' NP NP 10' 8' 8'     ltr 5"x15 ea 5"x15 5"x15 5"x15 5"x15 NP NP 5"x15     TaA bridge 2x10 ea 2x10 2x10 2x10 2x10 2x10 2x10 2x10     TaA bridge with clamshell             TaA marching 2x10 supine  2x10 supine  2x10 supine 2x10 NP supine  2x10 NP supine  2x10     Standing hip 4 way 3 way 20x ea 3 way  20 ea 3 way  20 ea 3 way  20 ea 3 way  20 ea 3 way  20 ea 3 way  20 ea 3 way  20 ea     Hr seated, 20x ea  (R self assist) seated  20 seated  20 seated  20 NP NP NP NP     Step ups 4" 10x ea 4"  15 ea 4"  1x10 ea 4"  1x10 ea 4"  1x10 ea 4" R  2" L  10 ea 4"  10 ea 6" R  4" L  15 ea     Leg press 10# 2x10 20#  2x10 NP 10#  2x10 10#  2x10 NP NP NP     sidestepping 4 laps 4 laps 4 laps 4 laps 4 laps 4  laps 4 laps 12ft x8     Lateral step up and over  4 inch  10 4 inch  1x12 4"  1x10 4"  1x10 NP 4"  1x10 NP     clamshells 2x10 ea blue  2x10 blue  2x10 blue  2x10 blue  2x10 blue  2x10 blue  2x10 black  2x10     toe raise  supine strap assist  20 ea supine strap assist  20 ea supine strap assist  20 ea supine  strap assist  20 ea supine  strap assist  20 ea supine  strap assist  20 ea supine  strap assist  20 ea     seated knee flexion machine    20#  2x10 20#  2x10 20#  2x10 20#  2x10 20#  2x10     treadmill     8' NP NP NP     Knee ext machine        10# L  20# R  to fatigue                                                             Modalities

## 2020-01-06 ENCOUNTER — OFFICE VISIT (OUTPATIENT)
Dept: PHYSICAL THERAPY | Facility: CLINIC | Age: 80
End: 2020-01-06
Payer: MEDICARE

## 2020-01-06 DIAGNOSIS — R29.898 WEAKNESS OF BOTH LEGS: Primary | ICD-10-CM

## 2020-01-06 PROCEDURE — 97110 THERAPEUTIC EXERCISES: CPT

## 2020-01-06 NOTE — PROGRESS NOTES
Daily Note     Today's date: 2020  Patient name: Mayuri Marquis  : 1940  MRN: [id number]  Referring provider: Jimena Davis MD  Dx:   Encounter Diagnosis     ICD-10-CM    1  Weakness of both legs R29 898                   Subjective: No significant changes to report since the last PT visit  Objective: See treatment diary below  (Patient 1 on 1 with MD, PT 4:50 PM - 5:00 PM)    Assessment: Bilateral lower extremity fatigue noted when resuming squats on the leg press machine  Plan: Continue treatment as per PT plan of care         Precautions: B/L LE weakness, fall risk    Manual  12/6 12/9 12/13 12/17 12/23 12/26 12/30 1/3 1/6                                               Exercise Diary  12/6 12/9 12/13 12/17 12/23 12/26 12/30 1/3 1/6    bike 8' 8' 8' NP NP 10' 8' 8' NP    ltr 5"x15 ea 5"x15 5"x15 5"x15 5"x15 NP NP 5"x15 5"x15    TaA bridge 2x10 ea 2x10 2x10 2x10 2x10 2x10 2x10 2x10 2x10    TaA bridge with clamshell             TaA marching 2x10 supine  2x10 supine  2x10 supine 2x10 NP supine  2x10 NP supine  2x10 supine  2x10    Standing hip 4 way 3 way 20x ea 3 way  20 ea 3 way  20 ea 3 way  20 ea 3 way  20 ea 3 way  20 ea 3 way  20 ea 3 way  20 ea 3 way  20 ea    Hr seated, 20x ea  (R self assist) seated  20 seated  20 seated  20 NP NP NP NP NP    Step ups 4" 10x ea 4"  15 ea 4"  1x10 ea 4"  1x10 ea 4"  1x10 ea 4" R  2" L  10 ea 4"  10 ea 6" R  4" L  15 ea 6" R  4" L  15 ea    Leg press 10# 2x10 20#  2x10 NP 10#  2x10 10#  2x10 NP NP NP 10#  1x20  1x15    sidestepping 4 laps 4 laps 4 laps 4 laps 4 laps 4  laps 4 laps 12ft x8 12ft x8    Lateral step up and over  4 inch  10 4 inch  1x12 4"  1x10 4"  1x10 NP 4"  1x10 NP NP    clamshells 2x10 ea blue  2x10 blue  2x10 blue  2x10 blue  2x10 blue  2x10 blue  2x10 black  2x10 black  2x10    toe raise  supine strap assist  20 ea supine strap assist  20 ea supine strap assist  20 ea supine  strap assist  20 ea supine  strap assist  20 ea supine  strap assist  20 ea supine  strap assist  20 ea supine  strap assist  20 ea    seated knee flexion machine    20#  2x10 20#  2x10 20#  2x10 20#  2x10 20#  2x10 20#  2x10    treadmill     8' NP NP NP 10'    Knee ext machine        10# L  20# R  to fatigue NP                                                            Modalities

## 2020-01-08 ENCOUNTER — OFFICE VISIT (OUTPATIENT)
Dept: PHYSICAL THERAPY | Facility: CLINIC | Age: 80
End: 2020-01-08
Payer: MEDICARE

## 2020-01-08 DIAGNOSIS — R29.898 WEAKNESS OF BOTH LEGS: Primary | ICD-10-CM

## 2020-01-08 PROCEDURE — 97110 THERAPEUTIC EXERCISES: CPT | Performed by: PHYSICAL THERAPIST

## 2020-01-08 NOTE — PROGRESS NOTES
Daily Note     Today's date: 2020  Patient name: Neeru Blum  : 1940  MRN: [id number]  Referring provider: Ruby Shipley MD  Dx:   No diagnosis found  Subjective: Pt reports that he continues to have sig difficulty with sit to stand transfer when there are no arms on a chair    Objective: See treatment diary below  Assessment: increased reps with therex as listed with no complaints other then fatigue  conitnue to progress as edilson    Plan: Continue treatment as per PT plan of care         Precautions: B/L LE weakness, fall risk    Manual  1/8       1/3 1/6                                               Exercise Diary  1/8       1/3 1/6    bike        8' NP    ltr 5"x20       5"x15 5"x15    TaA bridge 20       2x10 2x10    TaA bridge with clamshell             TaA marching supine x30       supine  2x10 supine  2x10    Standing hip 4 way 30       3 way  20 ea 3 way  20 ea    Hr        NP NP    Step ups 6"R 4"L 20ea       6" R  4" L  15 ea 6" R  4" L  15 ea    Leg press 10#x30       NP 10#  1x20  1x15    sidestepping 12ftx8       12ft x8 12ft x8    Lateral step up and over        NP NP    clamshells Black x30       black  2x10 black  2x10    toe raise        supine  strap assist  20 ea supine  strap assist  20 ea    seated knee flexion machine 20#x30       20#  2x10 20#  2x10    treadmill 10'       NP 10'    Knee ext machine 20# to fatigue       10# L  20# R  to fatigue NP    Sit to stand hi/low table with hand held assist nv                                                       Modalities

## 2020-01-13 ENCOUNTER — OFFICE VISIT (OUTPATIENT)
Dept: PHYSICAL THERAPY | Facility: CLINIC | Age: 80
End: 2020-01-13
Payer: MEDICARE

## 2020-01-13 DIAGNOSIS — R29.898 WEAKNESS OF BOTH LEGS: Primary | ICD-10-CM

## 2020-01-13 PROCEDURE — 97112 NEUROMUSCULAR REEDUCATION: CPT

## 2020-01-13 PROCEDURE — 97110 THERAPEUTIC EXERCISES: CPT

## 2020-01-13 NOTE — PROGRESS NOTES
Daily Note     Today's date: 2020  Patient name: Donivan Goldmann  : 1940  MRN: [id number]  Referring provider: Yudelka Carreon MD  Dx:   Encounter Diagnosis     ICD-10-CM    1  Weakness of both legs R29 898                   Subjective: Patient offers no new complaints at this time  Objective: See treatment diary below  Precautions: B/L LE weakness, fall risk    Manual  1/8       1/3 1/6                                               Exercise Diary  1/8 1/13      1/3 1/6    bike        8' NP    ltr 5"x20 5"x20      5"x15 5"x15    TaA bridge 20 x20      2x10 2x10    TaA bridge with clamshell             TaA marching supine x30 Supine x30      supine  2x10 supine  2x10    Standing hip 4 way 30 x30      3 way  20 ea 3 way  20 ea    Hr        NP NP    Step ups 6"R 4"L 20ea Unable to perform      6" R  4" L  15 ea 6" R  4" L  15 ea    Leg press 10#x30 10# x30      NP 10#  1x20  1x15    sidestepping 12ftx8 12'x8      12ft x8 12ft x8    Lateral step up and over        NP NP    clamshells Black x30       black  2x10 black  2x10    toe raise        supine  strap assist  20 ea supine  strap assist  20 ea    seated knee flexion machine 20#x30 20# 3x10      20#  2x10 20#  2x10    treadmill 10' 10'      NP 10'    Knee ext machine 20# to fatigue 20# to fatigue      10# L  20# R  to fatigue NP    Sit to stand hi/low table with hand held assist nv Hands on table x15                                                      Modalities                                                                Assessment: Pt has to lock B/L LE into TKE to maintain upright posture  Pt is unable to perform step ups today due to being unable to maintain knee ext  Plan: Continue per plan of care

## 2020-01-15 ENCOUNTER — OFFICE VISIT (OUTPATIENT)
Dept: PHYSICAL THERAPY | Facility: CLINIC | Age: 80
End: 2020-01-15
Payer: MEDICARE

## 2020-01-15 DIAGNOSIS — R29.898 WEAKNESS OF BOTH LEGS: Primary | ICD-10-CM

## 2020-01-15 PROCEDURE — 97112 NEUROMUSCULAR REEDUCATION: CPT

## 2020-01-15 PROCEDURE — 97110 THERAPEUTIC EXERCISES: CPT

## 2020-01-15 NOTE — PROGRESS NOTES
Daily Note     Today's date: 1/15/2020  Patient name: Aurora Moore  : 1940  MRN: [id number]  Referring provider: Jamaal Chakraborty MD  Dx:   Encounter Diagnosis     ICD-10-CM    1  Weakness of both legs R29 898                   Subjective: No significant changes to report in regards to bilateral lower extremity strength  Patient reports he is scheduled to have acupuncture next Friday  Objective: See treatment diary below  Assessment: Step ups are better tolerated when performed prior to squats on the leg press machine  Plan: Continue treatment as per PT plan of care      Precautions: B/L LE weakness, fall risk      Exercise Diary  1/8 1/13  1/15         1/3 1/6   bike               8' NP   ltr 5"x20 5"x20  5"x20         5"x15 5"x15   TaA bridge 20 x20  20         2x10 2x10   TaA bridge with clamshell                     TaA marching supine x30 Supine x30  supine   30         supine  2x10 supine  2x10   Standing hip 4 way 30 x30  30         3 way  20 ea 3 way  20 ea   Hr               NP NP   Step ups 6"R 4"L 20ea Unable to perform 6" R  4" L  15 ea         6" R  4" L  15 ea 6" R  4" L  15 ea   Leg press 10#x30 10# x30  10#    x30         NP 10#  1x20  1x15   sidestepping 12ftx8 12'x8  12ft x8         12ft x8 12ft x8   Lateral step up and over               NP NP   clamshells Black x30    black   30         black  2x10 black  2x10   toe raise               supine  strap assist  20 ea supine  strap assist  20 ea   seated knee flexion machine 20#x30 20# 3x10  20#   3x10         20#  2x10 20#  2x10   treadmill 10' 10'  10'         NP 10'   Knee ext machine 20# to fatigue 20# to fatigue  20#   to fatigue         10# L  20# R  to fatigue NP   Sit to stand hi/low table with hand held assist nv Hands on table x15  NP

## 2020-01-20 ENCOUNTER — OFFICE VISIT (OUTPATIENT)
Dept: PHYSICAL THERAPY | Facility: CLINIC | Age: 80
End: 2020-01-20
Payer: MEDICARE

## 2020-01-20 DIAGNOSIS — R29.898 WEAKNESS OF BOTH LEGS: Primary | ICD-10-CM

## 2020-01-20 PROCEDURE — 97530 THERAPEUTIC ACTIVITIES: CPT | Performed by: PHYSICAL THERAPIST

## 2020-01-20 PROCEDURE — 97110 THERAPEUTIC EXERCISES: CPT | Performed by: PHYSICAL THERAPIST

## 2020-01-20 NOTE — PROGRESS NOTES
Daily Note     Today's date: 2020  Patient name: Helen Ugarte  : 1940  MRN: [id number]  Referring provider: Russell Mckeon MD  Dx:   Encounter Diagnosis     ICD-10-CM    1  Weakness of both legs R29 898                   Subjective: Pt reports that the pain is constaint but the weakness in his legs is variable  Reports that he is feeling weak today  Patient reports he is scheduled to have acupuncture tthis Friday  Objective: See treatment diary below  Assessment: pt has more difficulty with therex today secondary to fatigue but denies any increase in pain    Plan: Continue treatment as per PT plan of care      Precautions: B/L LE weakness, fall risk      Exercise Diary  1/8 1/13  1/15  1/20       1/3 1/6   bike               8' NP   ltr 5"x20 5"x20  5"x20  5"x20       5"x15 5"x15   TaA bridge 20 x20  20  20       2x10 2x10   TaA bridge with clamshell                     TaA marching supine x30 Supine x30  supine   30  supine 30       supine  2x10 supine  2x10   Standing hip 4 way 30 x30  30  30       3 way  20 ea 3 way  20 ea   Hr               NP NP   Step ups 6"R 4"L 20ea Unable to perform 6" R  4" L  15 ea  15ea       6" R  4" L  15 ea 6" R  4" L  15 ea   Leg press 10#x30 10# x30  10#    x30  10#x30       NP 10#  1x20  1x15   sidestepping 12ftx8 12'x8  12ft x8  12ftx8       12ft x8 12ft x8   Lateral step up and over               NP NP   clamshells Black x30    black   30  black x30       black  2x10 black  2x10   toe raise               supine  strap assist  20 ea supine  strap assist  20 ea   seated knee flexion machine 20#x30 20# 3x10  20#   3x10  20#x30       20#  2x10 20#  2x10   treadmill 10' 10'  10'  10'       NP 10'   Knee ext machine 20# to fatigue 20# to fatigue  20#   to fatigue  20# fatigue       10# L  20# R  to fatigue NP   Sit to stand hi/low table with hand held assist nv Hands on table x15  NP

## 2020-01-22 ENCOUNTER — EVALUATION (OUTPATIENT)
Dept: PHYSICAL THERAPY | Facility: CLINIC | Age: 80
End: 2020-01-22
Payer: MEDICARE

## 2020-01-22 DIAGNOSIS — R29.898 WEAKNESS OF BOTH LEGS: Primary | ICD-10-CM

## 2020-01-22 PROCEDURE — 97530 THERAPEUTIC ACTIVITIES: CPT | Performed by: PHYSICAL THERAPIST

## 2020-01-22 PROCEDURE — 97110 THERAPEUTIC EXERCISES: CPT | Performed by: PHYSICAL THERAPIST

## 2020-01-22 NOTE — PROGRESS NOTES
PT Re-Evaluation     Today's date: 2020  Patient name: Veronica Davidson  : 1940  MRN: [id number]  Referring provider: Deepti Messer MD  Dx:   Encounter Diagnosis     ICD-10-CM    1  Weakness of both legs R29 898                   Assessment  Assessment details: Patient is a [de-identified] y o  male who is being tx with outpatient PT for   decreased rom, decreased strength, poor balance and decreased functional mobility  He has made slow gains in performance but continues to have sig deficits  He will benefit from skilled PT to address these deficits in order to achieve his goals and maximize his functional mobility and to progress to an independent hep  Understanding of Dx/Px/POC: excellent   Prognosis: fair    Goals  Short Term Goals: Independent performance of initial hep-met  Decrease pain 2 points on VAS-no met      Long Term Goals: Independent performance of comprehensive hep    Performance of IADL's is returned to max level of function  Performance in recreational activities is improved to max level of function  Able to walk [facility] to [facility] stairs with reciprocal gait pattern and single rail  Plan  Plan details: 2-4 weeks  Planned therapy interventions: IADL retraining, manual therapy, abdominal trunk stabilization, stretching, strengthening, therapeutic activities, therapeutic exercise, gait training, transfer training and home exercise program  Frequency: 2x week        Subjective Evaluation    History of Present Illness  Mechanism of injury: PT reports that he is noticing that his leg feel "a little stronger" since starting PT but that he continues to have limited    Reports that he has difficulty with sit to stand transfers and requires UE support to perform this  Fatigues quickly with walking household distances          History of L knee OA  Pain  Current pain ratin  At best pain ratin  At worst pain ratin    Patient Goals  Patient goals for therapy: improved balance, increased strength and independence with ADLs/IADLs          Objective      ambulates with a wide-based steppage gait to compensate for bilateral footdrop and abductor hip weakness        TU seconds      Strength:    Hip:  Flex: 4/5  Ext 4-/5  Abd: 3/5  Add: 4+/5  IR: 5-/5  ER: 4+/5      Knee:   Flex 4+/5  Ext 4+/5      Ankle   DF: 2/5  PF: 3/4  Inv: 3/5  Ev: 3/5           Precautions: B/L LE weakness, fall risk      Precautions: B/L LE weakness, fall risk      Exercise Diary  1/8 1/13  1/15  1/20  1/22     1/3 1/6   bike               8' NP   ltr 5"x20 5"x20  5"x20  5"x20  5"x20     5"x15 5"x15   TaA bridge 20 x20  20  20  with ball squeeze 30     2x10 2x10   TaA bridge with clamshell                     TaA marching supine x30 Supine x30  supine   30  supine 30  supine 30     supine  2x10 supine  2x10   Standing hip 4 way 30 x30  30  30 30     3 way  20 ea 3 way  20 ea   Hr               NP NP   Step ups 6"R 4"L 20ea Unable to perform 6" R  4" L  15 ea  15ea  6" ea     6" R  4" L  15 ea 6" R  4" L  15 ea   Leg press 10#x30 10# x30  10#    x30  10#x30  10#x30     NP 10#  1x20  1x15   sidestepping 12ftx8 12'x8  12ft x8  12ftx8  12ftx8     12ft x8 12ft x8   Lateral step up and over               NP NP   clamshells Black x30    black   30  black x30  black x30     black  2x10 black  2x10   toe raise               supine  strap assist  20 ea supine  strap assist  20 ea   seated knee flexion machine 20#x30 20# 3x10  20#   3x10  20#x30  20#x30     20#  2x10 20#  2x10   treadmill 10' 10'  10'  10'  10'     NP 10'   Knee ext machine 20# to fatigue 20# to fatigue  20#   to fatigue  20# fatigue  20# to fatigue     10# L  20# R  to fatigue NP   Sit to stand hi/low table with hand held assist nv Hands on table x15  NP

## 2020-01-27 ENCOUNTER — OFFICE VISIT (OUTPATIENT)
Dept: PHYSICAL THERAPY | Facility: CLINIC | Age: 80
End: 2020-01-27
Payer: MEDICARE

## 2020-01-27 DIAGNOSIS — R29.898 WEAKNESS OF BOTH LEGS: Primary | ICD-10-CM

## 2020-01-27 PROCEDURE — 97110 THERAPEUTIC EXERCISES: CPT

## 2020-01-27 PROCEDURE — 97530 THERAPEUTIC ACTIVITIES: CPT

## 2020-01-27 PROCEDURE — 97112 NEUROMUSCULAR REEDUCATION: CPT

## 2020-01-27 NOTE — PROGRESS NOTES
Daily Note     Today's date: 2020  Patient name: Rick Cobos  : 1940  MRN: [id number]  Referring provider: Lillian Coleman MD  Dx:   Encounter Diagnosis     ICD-10-CM    1  Weakness of both legs R29 898                   Subjective: No significant changes to report since the last PT treatment  Objective: See treatment diary below  Assessment: Patient has increased difficulty performing step ups today because of bilateral lower extremity weakness and fatigue therefore performed these on the 4 inch step rather than the 6 inch step  Bilateral lower extremity fatigue also noted when performing squats on the leg press machine  Plan: Continue treatment as per PT plan of care         Precautions: B/L LE weakness, fall risk     Exercise Diary  1/8 1/13  1/15  1/20  1/22  1/27       bike                   ltr 5"x20 5"x20  5"x20  5"x20  5"x20  5"x20       TaA bridge 20 x20  20  20 with ball squeeze 30  20       TaA bridge with clamshell                   TaA marching supine x30 Supine x30  supine   30  supine 30  supine 30  supine   30       Standing hip 4 way 30 x30  30  30 30  30       Hr                   Step ups 6"R 4"L 20ea Unable to perform 6" R  4" L  15 ea  15ea  6" ea  4"   15 ea       Leg press 10#x30 10# x30  10#    x30  10#x30  10#x30  10#   x30       sidestepping 12ftx8 12'x8  12ft x8  12ftx8  12ftx8  12ft x8       Lateral step up and over                   clamshells Black x30    black   30  black x30  black x30  black   30       toe raise                   seated knee flexion machine 20#x30 20# 3x10  20#   3x10  20#x30  20#x30  30#   x30       treadmill 10' 10'  10'  10'  10'  10'       Knee ext machine 20# to fatigue 20# to fatigue  20#   to fatigue  20# fatigue  20# to fatigue 20#  to fatigue       Sit to stand hi/low table with hand held assist nv Hands on table x15  NP               rockerboard M/L           20

## 2020-01-29 ENCOUNTER — OFFICE VISIT (OUTPATIENT)
Dept: PHYSICAL THERAPY | Facility: CLINIC | Age: 80
End: 2020-01-29
Payer: MEDICARE

## 2020-01-29 DIAGNOSIS — R29.898 WEAKNESS OF BOTH LEGS: Primary | ICD-10-CM

## 2020-01-29 PROCEDURE — 97110 THERAPEUTIC EXERCISES: CPT

## 2020-01-29 PROCEDURE — 97530 THERAPEUTIC ACTIVITIES: CPT

## 2020-01-29 PROCEDURE — 97112 NEUROMUSCULAR REEDUCATION: CPT

## 2020-01-29 NOTE — PROGRESS NOTES
Daily Note     Today's date: 2020  Patient name: Kay Smith  : 1940  MRN: [id number]  Referring provider: Stephen Whitfield MD  Dx:   Encounter Diagnosis     ICD-10-CM    1  Weakness of both legs R29 898                   Subjective: No significant changes to report since the last PT treatment  Objective: See treatment diary below  Assessment: Bilateral knees buckle and cause patient to fall forward onto bilateral knees during rockerboard M/L exercise - he requires assistance to return to standing - no bilateral knee pain reported afterwards  Plan: Continue treatment as per PT plan of care         Precautions: B/L LE weakness, fall risk     Exercise Diary  1/8 1/13  1/15  1/20  1/22  1/27  1/29     bike                   ltr 5"x20 5"x20  5"x20  5"x20  5"x20  5"x20  5"x20     TaA bridge 20 x20  20  20 with ball squeeze 30  20 with ball squeeze  5"x20     TaA bridge with clamshell                   TaA marching supine x30 Supine x30  supine   30  supine 30  supine 30  supine   30  supine    30     Standing hip 4 way 30 x30  30  30 30  30  30     Hr                   Step ups 6"R 4"L 20ea Unable to perform 6" R  4" L  15 ea  15ea  6" ea  4"   15 ea  4"   15 ea     Leg press 10#x30 10# x30  10#    x30  10#x30  10#x30  10#   x30  NP     sidestepping 12ftx8 12'x8  12ft x8  12ftx8  12ftx8  12ft x8  12ft x8     Lateral step up and over                   clamshells Black x30    black   30  black x30  black x30  black   30  black   30     toe raise                   seated knee flexion machine 20#x30 20# 3x10  20#   3x10  20#x30  20#x30  30#   x30  20#   30     treadmill 10' 10'  10'  10'  10'  10'  10'     Knee ext machine 20# to fatigue 20# to fatigue  20#   to fatigue  20# fatigue  20# to fatigue 20#  to fatigue  20#   30     Sit to stand hi/low table with hand held assist nv Hands on table x15  NP               rockerboard M/L           20  20

## 2020-02-03 ENCOUNTER — OFFICE VISIT (OUTPATIENT)
Dept: PHYSICAL THERAPY | Facility: CLINIC | Age: 80
End: 2020-02-03
Payer: MEDICARE

## 2020-02-03 DIAGNOSIS — R29.898 WEAKNESS OF BOTH LEGS: Primary | ICD-10-CM

## 2020-02-03 PROCEDURE — 97112 NEUROMUSCULAR REEDUCATION: CPT

## 2020-02-03 PROCEDURE — 97530 THERAPEUTIC ACTIVITIES: CPT

## 2020-02-03 PROCEDURE — 97110 THERAPEUTIC EXERCISES: CPT

## 2020-02-03 NOTE — PROGRESS NOTES
Daily Note     Today's date: 2/3/2020  Patient name: Clara Randhawa  : 1940  MRN: [id number]  Referring provider: Jj Malone MD  Dx:   Encounter Diagnosis     ICD-10-CM    1  Weakness of both legs R29 898                   Subjective: Patient noted some pain in L knee  Objective: See treatment diary below      Assessment: Patient needed VC to not lift hips so far off table with LTR  Patient was challenged with seated knee extension machine performed 8 reps with 20# and 8 reps with 10# then noting fatigue  patient requested not to perform rocker board due to his knees buckling when he goes side to side  Patient presented with fatigue post treatment  Plan: Continue with POC        Precautions: B/L LE weakness, fall risk     Exercise Diary  1/8 1/13  1/15  1/20  1/22  1/27  1/29 2/3    bike                   ltr 5"x20 5"x20  5"x20  5"x20  5"x20  5"x20  5"x20 5"x20    TaA bridge 20 x20  20  20 with ball squeeze 30  20 with ball squeeze  5"x20 with ball squeeze  5"x20    TaA bridge with clamshell                   TaA marching supine x30 Supine x30  supine   30  supine 30  supine 30  supine   30  supine    30 supine    30    Standing hip 4 way 30 x30  30  30 30  30  30 30    Hr                   Step ups 6"R 4"L 20ea Unable to perform 6" R  4" L  15 ea  15ea  6" ea  4"   15 ea  4"   15 ea 4"x15 ea    Leg press 10#x30 10# x30  10#    x30  10#x30  10#x30  10#   x30  NP np    sidestepping 12ftx8 12'x8  12ft x8  12ftx8  12ftx8  12ft x8  12ft x8 12 ft x 8     Lateral step up and over                   clamshells Black x30    black   30  black x30  black x30  black   30  black   30 Black 30     toe raise                   seated knee flexion machine 20#x30 20# 3x10  20#   3x10  20#x30  20#x30  30#   x30  20#   30 20# x30    treadmill 10' 10'  10'  10'  10'  10'  10' 10'    Knee ext machine 20# to fatigue 20# to fatigue  20#   to fatigue  20# fatigue  20# to fatigue 20#  to fatigue  20#   30 20# 8  10#     Sit to stand hi/low table with hand held assist nv Hands on table x15  NP               rockerboard M/L           20  20  pt   requested not to perform

## 2020-02-05 ENCOUNTER — OFFICE VISIT (OUTPATIENT)
Dept: PHYSICAL THERAPY | Facility: CLINIC | Age: 80
End: 2020-02-05
Payer: MEDICARE

## 2020-02-05 DIAGNOSIS — R29.898 WEAKNESS OF BOTH LEGS: Primary | ICD-10-CM

## 2020-02-05 PROCEDURE — 97112 NEUROMUSCULAR REEDUCATION: CPT

## 2020-02-05 PROCEDURE — 97530 THERAPEUTIC ACTIVITIES: CPT

## 2020-02-05 PROCEDURE — 97110 THERAPEUTIC EXERCISES: CPT

## 2020-02-05 NOTE — PROGRESS NOTES
Daily Note/DC Summary    Today's date: 2020  Patient name: Miracle Summers  : 1940  MRN: [id number]  Referring provider: Bassam Tyson MD  Dx:   Encounter Diagnosis     ICD-10-CM    1  Weakness of both legs R29 898                   Subjective: Patient complains he continues to struggle with sit to stand transfers  Objective: See treatment diary below  Assessment: Bilateral lower extremity fatigue noted throughout therapeutic exercise program     Plan: Discharge patient from outpatient PT    Patient will potentially join clinic's fitness program        Precautions: B/L LE weakness, fall risk     Exercise Diary  1/8 1/13  1/15  1/20  1/22  1/27  1/29 2/3 2/5   bike                   ltr 5"x20 5"x20  5"x20  5"x20  5"x20  5"x20  5"x20 5"x20 5"x20   TaA bridge 20 x20  20  20 with ball squeeze 30  20 with ball squeeze  5"x20 with ball squeeze  5"x20 5"x20   TaA bridge with clamshell                   TaA marching supine x30 Supine x30  supine   30  supine 30  supine 30  supine   30  supine    30 supine    30 supine  30   Standing hip 4 way 30 x30  30  30 30  30  30 30 30   Hr                   Step ups 6"R 4"L 20ea Unable to perform 6" R  4" L  15 ea  15ea  6" ea  4"   15 ea  4"   15 ea 4"x15 ea 4"  10 ea   Leg press 10#x30 10# x30  10#    x30  10#x30  10#x30  10#   x30  NP np NP   sidestepping 12ftx8 12'x8  12ft x8  12ftx8  12ftx8  12ft x8  12ft x8 12 ft x 8  12ft x8   Lateral step up and over                   clamshells Black x30    black   30  black x30  black x30  black   30  black   30 Black 30  black  30   toe raise                   seated knee flexion machine 20#x30 20# 3x10  20#   3x10  20#x30  20#x30  30#   x30  20#   30 20# x30 20# x30   treadmill 10' 10'  10'  10'  10'  10'  10' 10' 10'   Knee ext machine 20# to fatigue 20# to fatigue  20#   to fatigue  20# fatigue  20# to fatigue 20#  to fatigue  20#   30 20# 8  10#  20# x30   Sit to stand hi/low table with hand held assist nv Hands on table x15  NP            15   rockerboard M/L           20  20  pt   requested not to perform  NP

## 2021-02-12 DIAGNOSIS — Z23 ENCOUNTER FOR IMMUNIZATION: ICD-10-CM

## 2021-02-23 ENCOUNTER — LAB (OUTPATIENT)
Dept: LAB | Facility: CLINIC | Age: 81
End: 2021-02-23
Payer: MEDICARE

## 2021-02-23 DIAGNOSIS — C67.0 MALIGNANT NEOPLASM OF TRIGONE OF URINARY BLADDER (HCC): ICD-10-CM

## 2021-02-23 DIAGNOSIS — Z01.818 PREOPERATIVE EXAMINATION, UNSPECIFIED: ICD-10-CM

## 2021-02-23 DIAGNOSIS — N20.0 CALCULUS OF KIDNEY: Primary | ICD-10-CM

## 2021-02-23 LAB
ANION GAP SERPL CALCULATED.3IONS-SCNC: 5 MMOL/L (ref 4–13)
BACTERIA UR QL AUTO: ABNORMAL /HPF
BASOPHILS # BLD AUTO: 0.04 THOUSANDS/ΜL (ref 0–0.1)
BASOPHILS NFR BLD AUTO: 1 % (ref 0–1)
BILIRUB UR QL STRIP: NEGATIVE
BUN SERPL-MCNC: 16 MG/DL (ref 5–25)
CALCIUM SERPL-MCNC: 10 MG/DL (ref 8.3–10.1)
CHLORIDE SERPL-SCNC: 107 MMOL/L (ref 100–108)
CLARITY UR: CLEAR
CO2 SERPL-SCNC: 31 MMOL/L (ref 21–32)
COLOR UR: YELLOW
CREAT SERPL-MCNC: 0.68 MG/DL (ref 0.6–1.3)
EOSINOPHIL # BLD AUTO: 0.15 THOUSAND/ΜL (ref 0–0.61)
EOSINOPHIL NFR BLD AUTO: 2 % (ref 0–6)
ERYTHROCYTE [DISTWIDTH] IN BLOOD BY AUTOMATED COUNT: 13 % (ref 11.6–15.1)
GFR SERPL CREATININE-BSD FRML MDRD: 90 ML/MIN/1.73SQ M
GLUCOSE P FAST SERPL-MCNC: 112 MG/DL (ref 65–99)
GLUCOSE UR STRIP-MCNC: NEGATIVE MG/DL
HCT VFR BLD AUTO: 49.5 % (ref 36.5–49.3)
HGB BLD-MCNC: 16.2 G/DL (ref 12–17)
HGB UR QL STRIP.AUTO: NEGATIVE
IMM GRANULOCYTES # BLD AUTO: 0.01 THOUSAND/UL (ref 0–0.2)
IMM GRANULOCYTES NFR BLD AUTO: 0 % (ref 0–2)
KETONES UR STRIP-MCNC: NEGATIVE MG/DL
LEUKOCYTE ESTERASE UR QL STRIP: NEGATIVE
LYMPHOCYTES # BLD AUTO: 2.29 THOUSANDS/ΜL (ref 0.6–4.47)
LYMPHOCYTES NFR BLD AUTO: 31 % (ref 14–44)
MCH RBC QN AUTO: 32.5 PG (ref 26.8–34.3)
MCHC RBC AUTO-ENTMCNC: 32.7 G/DL (ref 31.4–37.4)
MCV RBC AUTO: 99 FL (ref 82–98)
MONOCYTES # BLD AUTO: 0.5 THOUSAND/ΜL (ref 0.17–1.22)
MONOCYTES NFR BLD AUTO: 7 % (ref 4–12)
NEUTROPHILS # BLD AUTO: 4.51 THOUSANDS/ΜL (ref 1.85–7.62)
NEUTS SEG NFR BLD AUTO: 59 % (ref 43–75)
NITRITE UR QL STRIP: NEGATIVE
NON-SQ EPI CELLS URNS QL MICRO: ABNORMAL /HPF
NRBC BLD AUTO-RTO: 0 /100 WBCS
PH UR STRIP.AUTO: 6.5 [PH]
PLATELET # BLD AUTO: 220 THOUSANDS/UL (ref 149–390)
PMV BLD AUTO: 10.6 FL (ref 8.9–12.7)
POTASSIUM SERPL-SCNC: 4.2 MMOL/L (ref 3.5–5.3)
PROT UR STRIP-MCNC: NEGATIVE MG/DL
RBC # BLD AUTO: 4.99 MILLION/UL (ref 3.88–5.62)
RBC #/AREA URNS AUTO: ABNORMAL /HPF
SODIUM SERPL-SCNC: 143 MMOL/L (ref 136–145)
SP GR UR STRIP.AUTO: 1.01 (ref 1–1.03)
UROBILINOGEN UR QL STRIP.AUTO: 0.2 E.U./DL
WBC # BLD AUTO: 7.5 THOUSAND/UL (ref 4.31–10.16)
WBC #/AREA URNS AUTO: ABNORMAL /HPF

## 2021-02-23 PROCEDURE — 81001 URINALYSIS AUTO W/SCOPE: CPT

## 2021-02-23 PROCEDURE — 80048 BASIC METABOLIC PNL TOTAL CA: CPT

## 2021-02-23 PROCEDURE — 87086 URINE CULTURE/COLONY COUNT: CPT

## 2021-02-23 PROCEDURE — 85025 COMPLETE CBC W/AUTO DIFF WBC: CPT

## 2021-02-23 PROCEDURE — 36415 COLL VENOUS BLD VENIPUNCTURE: CPT

## 2021-02-24 LAB — BACTERIA UR CULT: NORMAL

## 2021-04-20 ENCOUNTER — TRANSCRIBE ORDERS (OUTPATIENT)
Dept: LAB | Facility: CLINIC | Age: 81
End: 2021-04-20

## 2021-04-20 ENCOUNTER — APPOINTMENT (OUTPATIENT)
Dept: LAB | Facility: CLINIC | Age: 81
End: 2021-04-20
Payer: MEDICARE

## 2021-04-20 DIAGNOSIS — C67.0 MALIGNANT NEOPLASM OF TRIGONE OF URINARY BLADDER (HCC): ICD-10-CM

## 2021-04-20 DIAGNOSIS — C67.0 MALIGNANT NEOPLASM OF TRIGONE OF URINARY BLADDER (HCC): Primary | ICD-10-CM

## 2021-04-20 LAB
ANION GAP SERPL CALCULATED.3IONS-SCNC: 5 MMOL/L (ref 4–13)
BUN SERPL-MCNC: 18 MG/DL (ref 5–25)
CALCIUM SERPL-MCNC: 9.4 MG/DL (ref 8.3–10.1)
CHLORIDE SERPL-SCNC: 106 MMOL/L (ref 100–108)
CO2 SERPL-SCNC: 30 MMOL/L (ref 21–32)
CREAT SERPL-MCNC: 0.67 MG/DL (ref 0.6–1.3)
GFR SERPL CREATININE-BSD FRML MDRD: 90 ML/MIN/1.73SQ M
GLUCOSE SERPL-MCNC: 118 MG/DL (ref 65–140)
POTASSIUM SERPL-SCNC: 3.9 MMOL/L (ref 3.5–5.3)
SODIUM SERPL-SCNC: 141 MMOL/L (ref 136–145)

## 2021-04-20 PROCEDURE — 80048 BASIC METABOLIC PNL TOTAL CA: CPT

## 2021-04-20 PROCEDURE — 36415 COLL VENOUS BLD VENIPUNCTURE: CPT

## 2021-11-05 ENCOUNTER — APPOINTMENT (OUTPATIENT)
Dept: LAB | Facility: CLINIC | Age: 81
End: 2021-11-05
Payer: MEDICARE

## 2021-11-05 DIAGNOSIS — G12.29 PSEUDOBULBAR PALSY (HCC): ICD-10-CM

## 2021-11-05 DIAGNOSIS — G60.3 IDIOPATHIC PROGRESSIVE POLYNEUROPATHY: ICD-10-CM

## 2021-11-05 DIAGNOSIS — G62.9 PERIPHERAL NERVE DISORDER: ICD-10-CM

## 2021-11-05 LAB
EST. AVERAGE GLUCOSE BLD GHB EST-MCNC: 123 MG/DL
FOLATE SERPL-MCNC: >20 NG/ML (ref 3.1–17.5)
HBA1C MFR BLD: 5.9 %
VIT B12 SERPL-MCNC: 483 PG/ML (ref 100–900)

## 2021-11-05 PROCEDURE — 86038 ANTINUCLEAR ANTIBODIES: CPT

## 2021-11-05 PROCEDURE — 83036 HEMOGLOBIN GLYCOSYLATED A1C: CPT

## 2021-11-05 PROCEDURE — 82746 ASSAY OF FOLIC ACID SERUM: CPT

## 2021-11-05 PROCEDURE — 86790 VIRUS ANTIBODY NOS: CPT

## 2021-11-05 PROCEDURE — 86235 NUCLEAR ANTIGEN ANTIBODY: CPT

## 2021-11-05 PROCEDURE — 86225 DNA ANTIBODY NATIVE: CPT

## 2021-11-05 PROCEDURE — 82607 VITAMIN B-12: CPT

## 2021-11-06 LAB
DSDNA AB SER-ACNC: <1 IU/ML (ref 0–9)
ENA SS-A AB SER-ACNC: <0.2 AI (ref 0–0.9)
ENA SS-B AB SER-ACNC: <0.2 AI (ref 0–0.9)
HTLV I+II AB SER QL IA: NEGATIVE

## 2021-11-08 LAB — RYE IGE QN: NEGATIVE

## 2021-12-03 ENCOUNTER — APPOINTMENT (OUTPATIENT)
Dept: LAB | Facility: CLINIC | Age: 81
End: 2021-12-03
Payer: MEDICARE

## 2021-12-03 DIAGNOSIS — C67.0 MALIGNANT NEOPLASM OF TRIGONE OF URINARY BLADDER (HCC): ICD-10-CM

## 2021-12-03 LAB
ANION GAP SERPL CALCULATED.3IONS-SCNC: 4 MMOL/L (ref 4–13)
BUN SERPL-MCNC: 17 MG/DL (ref 5–25)
CALCIUM SERPL-MCNC: 8.8 MG/DL (ref 8.3–10.1)
CHLORIDE SERPL-SCNC: 105 MMOL/L (ref 100–108)
CO2 SERPL-SCNC: 29 MMOL/L (ref 21–32)
CREAT SERPL-MCNC: 0.68 MG/DL (ref 0.6–1.3)
GFR SERPL CREATININE-BSD FRML MDRD: 89 ML/MIN/1.73SQ M
GLUCOSE P FAST SERPL-MCNC: 107 MG/DL (ref 65–99)
POTASSIUM SERPL-SCNC: 3.9 MMOL/L (ref 3.5–5.3)
SODIUM SERPL-SCNC: 138 MMOL/L (ref 136–145)

## 2021-12-03 PROCEDURE — 80048 BASIC METABOLIC PNL TOTAL CA: CPT

## 2021-12-03 PROCEDURE — 36415 COLL VENOUS BLD VENIPUNCTURE: CPT

## 2022-01-13 ENCOUNTER — TRANSCRIBE ORDERS (OUTPATIENT)
Dept: LAB | Facility: CLINIC | Age: 82
End: 2022-01-13

## 2022-01-13 ENCOUNTER — APPOINTMENT (OUTPATIENT)
Dept: LAB | Facility: CLINIC | Age: 82
End: 2022-01-13
Payer: MEDICARE

## 2022-01-13 DIAGNOSIS — G62.9 NEUROPATHY: Primary | ICD-10-CM

## 2022-01-13 DIAGNOSIS — G62.9 PERIPHERAL NERVE DISORDER: Primary | ICD-10-CM

## 2022-01-13 DIAGNOSIS — G12.29: ICD-10-CM

## 2022-01-13 DIAGNOSIS — G62.9 NEUROPATHY: ICD-10-CM

## 2022-01-13 PROCEDURE — 87389 HIV-1 AG W/HIV-1&-2 AB AG IA: CPT

## 2022-01-13 PROCEDURE — 36415 COLL VENOUS BLD VENIPUNCTURE: CPT

## 2022-01-13 PROCEDURE — 83519 RIA NONANTIBODY: CPT

## 2022-01-13 PROCEDURE — 86255 FLUORESCENT ANTIBODY SCREEN: CPT

## 2022-01-14 LAB — HIV 1+2 AB+HIV1 P24 AG SERPL QL IA: NORMAL

## 2022-01-20 LAB — MISCELLANEOUS LAB TEST RESULT: NORMAL

## 2022-01-28 ENCOUNTER — APPOINTMENT (OUTPATIENT)
Dept: LAB | Facility: CLINIC | Age: 82
End: 2022-01-28
Payer: MEDICARE

## 2022-01-28 DIAGNOSIS — G62.9 PERIPHERAL NERVE DISORDER: ICD-10-CM

## 2022-01-28 PROCEDURE — 84165 PROTEIN E-PHORESIS SERUM: CPT

## 2022-01-28 PROCEDURE — 84165 PROTEIN E-PHORESIS SERUM: CPT | Performed by: PATHOLOGY

## 2022-01-28 PROCEDURE — 36415 COLL VENOUS BLD VENIPUNCTURE: CPT

## 2022-01-31 LAB
ALBUMIN SERPL ELPH-MCNC: 4.67 G/DL (ref 3.5–5)
ALBUMIN SERPL ELPH-MCNC: 66.7 % (ref 52–65)
ALPHA1 GLOB SERPL ELPH-MCNC: 0.24 G/DL (ref 0.1–0.4)
ALPHA1 GLOB SERPL ELPH-MCNC: 3.4 % (ref 2.5–5)
ALPHA2 GLOB SERPL ELPH-MCNC: 0.76 G/DL (ref 0.4–1.2)
ALPHA2 GLOB SERPL ELPH-MCNC: 10.8 % (ref 7–13)
BETA GLOB ABNORMAL SERPL ELPH-MCNC: 0.32 G/DL (ref 0.4–0.8)
BETA1 GLOB SERPL ELPH-MCNC: 4.5 % (ref 5–13)
BETA2 GLOB SERPL ELPH-MCNC: 4.6 % (ref 2–8)
BETA2+GAMMA GLOB SERPL ELPH-MCNC: 0.32 G/DL (ref 0.2–0.5)
GAMMA GLOB ABNORMAL SERPL ELPH-MCNC: 0.7 G/DL (ref 0.5–1.6)
GAMMA GLOB SERPL ELPH-MCNC: 10 % (ref 12–22)
IGG/ALB SER: 2 {RATIO} (ref 1.1–1.8)
PROT PATTERN SERPL ELPH-IMP: ABNORMAL
PROT SERPL-MCNC: 7 G/DL (ref 6.4–8.2)

## 2022-03-18 ENCOUNTER — APPOINTMENT (OUTPATIENT)
Dept: LAB | Facility: CLINIC | Age: 82
End: 2022-03-18
Payer: MEDICARE

## 2022-03-18 ENCOUNTER — TRANSCRIBE ORDERS (OUTPATIENT)
Dept: LAB | Facility: CLINIC | Age: 82
End: 2022-03-18

## 2022-03-18 DIAGNOSIS — G62.9 PERIPHERAL NERVE DISORDER: ICD-10-CM

## 2022-03-18 DIAGNOSIS — C67.9 MALIGNANT NEOPLASM OF URINARY BLADDER, UNSPECIFIED SITE (HCC): ICD-10-CM

## 2022-03-18 DIAGNOSIS — R53.1 ASTHENIA: ICD-10-CM

## 2022-03-18 DIAGNOSIS — C67.9 MALIGNANT NEOPLASM OF URINARY BLADDER, UNSPECIFIED SITE (HCC): Primary | ICD-10-CM

## 2022-03-18 LAB
ANION GAP SERPL CALCULATED.3IONS-SCNC: 5 MMOL/L (ref 4–13)
BACTERIA UR QL AUTO: ABNORMAL /HPF
BASOPHILS # BLD AUTO: 0.03 THOUSANDS/ΜL (ref 0–0.1)
BASOPHILS NFR BLD AUTO: 0 % (ref 0–1)
BILIRUB UR QL STRIP: NEGATIVE
BUN SERPL-MCNC: 15 MG/DL (ref 5–25)
CALCIUM SERPL-MCNC: 9.3 MG/DL (ref 8.3–10.1)
CHLORIDE SERPL-SCNC: 107 MMOL/L (ref 100–108)
CLARITY UR: CLEAR
CO2 SERPL-SCNC: 29 MMOL/L (ref 21–32)
COLOR UR: ABNORMAL
CREAT SERPL-MCNC: 0.56 MG/DL (ref 0.6–1.3)
EOSINOPHIL # BLD AUTO: 0.19 THOUSAND/ΜL (ref 0–0.61)
EOSINOPHIL NFR BLD AUTO: 3 % (ref 0–6)
ERYTHROCYTE [DISTWIDTH] IN BLOOD BY AUTOMATED COUNT: 13 % (ref 11.6–15.1)
GFR SERPL CREATININE-BSD FRML MDRD: 96 ML/MIN/1.73SQ M
GLUCOSE P FAST SERPL-MCNC: 120 MG/DL (ref 65–99)
GLUCOSE UR STRIP-MCNC: NEGATIVE MG/DL
HCT VFR BLD AUTO: 45.1 % (ref 36.5–49.3)
HGB BLD-MCNC: 15.4 G/DL (ref 12–17)
HGB UR QL STRIP.AUTO: NEGATIVE
IMM GRANULOCYTES # BLD AUTO: 0.02 THOUSAND/UL (ref 0–0.2)
IMM GRANULOCYTES NFR BLD AUTO: 0 % (ref 0–2)
KETONES UR STRIP-MCNC: NEGATIVE MG/DL
LEUKOCYTE ESTERASE UR QL STRIP: ABNORMAL
LYMPHOCYTES # BLD AUTO: 2.04 THOUSANDS/ΜL (ref 0.6–4.47)
LYMPHOCYTES NFR BLD AUTO: 27 % (ref 14–44)
MCH RBC QN AUTO: 32.3 PG (ref 26.8–34.3)
MCHC RBC AUTO-ENTMCNC: 34.1 G/DL (ref 31.4–37.4)
MCV RBC AUTO: 95 FL (ref 82–98)
MONOCYTES # BLD AUTO: 0.44 THOUSAND/ΜL (ref 0.17–1.22)
MONOCYTES NFR BLD AUTO: 6 % (ref 4–12)
MUCOUS THREADS UR QL AUTO: ABNORMAL
NEUTROPHILS # BLD AUTO: 4.78 THOUSANDS/ΜL (ref 1.85–7.62)
NEUTS SEG NFR BLD AUTO: 64 % (ref 43–75)
NITRITE UR QL STRIP: NEGATIVE
NON-SQ EPI CELLS URNS QL MICRO: ABNORMAL /HPF
NRBC BLD AUTO-RTO: 0 /100 WBCS
PH UR STRIP.AUTO: 7 [PH]
PLATELET # BLD AUTO: 228 THOUSANDS/UL (ref 149–390)
PMV BLD AUTO: 10.3 FL (ref 8.9–12.7)
POTASSIUM SERPL-SCNC: 3.9 MMOL/L (ref 3.5–5.3)
PROT UR STRIP-MCNC: NEGATIVE MG/DL
RBC # BLD AUTO: 4.77 MILLION/UL (ref 3.88–5.62)
RBC #/AREA URNS AUTO: ABNORMAL /HPF
SODIUM SERPL-SCNC: 141 MMOL/L (ref 136–145)
SP GR UR STRIP.AUTO: 1.01 (ref 1–1.03)
UROBILINOGEN UR STRIP-ACNC: <2 MG/DL
WBC # BLD AUTO: 7.5 THOUSAND/UL (ref 4.31–10.16)
WBC #/AREA URNS AUTO: ABNORMAL /HPF

## 2022-03-18 PROCEDURE — 83520 IMMUNOASSAY QUANT NOS NONAB: CPT

## 2022-03-18 PROCEDURE — 80048 BASIC METABOLIC PNL TOTAL CA: CPT

## 2022-03-18 PROCEDURE — 87086 URINE CULTURE/COLONY COUNT: CPT

## 2022-03-18 PROCEDURE — 36415 COLL VENOUS BLD VENIPUNCTURE: CPT

## 2022-03-18 PROCEDURE — 85025 COMPLETE CBC W/AUTO DIFF WBC: CPT

## 2022-03-18 PROCEDURE — 81001 URINALYSIS AUTO W/SCOPE: CPT

## 2022-03-20 LAB — BACTERIA UR CULT: NORMAL

## 2022-08-05 ENCOUNTER — TRANSCRIBE ORDERS (OUTPATIENT)
Dept: LAB | Facility: CLINIC | Age: 82
End: 2022-08-05

## 2022-08-05 ENCOUNTER — APPOINTMENT (OUTPATIENT)
Dept: LAB | Facility: CLINIC | Age: 82
End: 2022-08-05
Payer: MEDICARE

## 2022-08-05 DIAGNOSIS — G12.21 AMYOTROPHIC LATERAL SCLEROSIS (HCC): Primary | ICD-10-CM

## 2022-08-05 DIAGNOSIS — G12.21 AMYOTROPHIC LATERAL SCLEROSIS (HCC): ICD-10-CM

## 2022-08-05 LAB
ALBUMIN SERPL BCP-MCNC: 3.4 G/DL (ref 3.5–5)
ALP SERPL-CCNC: 94 U/L (ref 46–116)
ALT SERPL W P-5'-P-CCNC: 31 U/L (ref 12–78)
AST SERPL W P-5'-P-CCNC: 24 U/L (ref 5–45)
BILIRUB DIRECT SERPL-MCNC: 0.11 MG/DL (ref 0–0.2)
BILIRUB SERPL-MCNC: 0.47 MG/DL (ref 0.2–1)
PROT SERPL-MCNC: 7.3 G/DL (ref 6.4–8.4)

## 2022-08-05 PROCEDURE — 80076 HEPATIC FUNCTION PANEL: CPT

## 2022-08-05 PROCEDURE — 36415 COLL VENOUS BLD VENIPUNCTURE: CPT

## 2022-09-07 ENCOUNTER — TRANSCRIBE ORDERS (OUTPATIENT)
Dept: LAB | Facility: CLINIC | Age: 82
End: 2022-09-07

## 2022-09-07 ENCOUNTER — APPOINTMENT (OUTPATIENT)
Dept: LAB | Facility: CLINIC | Age: 82
End: 2022-09-07
Payer: MEDICARE

## 2022-09-07 DIAGNOSIS — G12.21 AMYOTROPHIC LATERAL SCLEROSIS (HCC): ICD-10-CM

## 2022-09-07 DIAGNOSIS — G12.21 AMYOTROPHIC LATERAL SCLEROSIS (HCC): Primary | ICD-10-CM

## 2022-09-07 LAB
ALBUMIN SERPL BCP-MCNC: 3.5 G/DL (ref 3.5–5)
ALP SERPL-CCNC: 84 U/L (ref 46–116)
ALT SERPL W P-5'-P-CCNC: 33 U/L (ref 12–78)
AST SERPL W P-5'-P-CCNC: 24 U/L (ref 5–45)
BILIRUB DIRECT SERPL-MCNC: 0.1 MG/DL (ref 0–0.2)
BILIRUB SERPL-MCNC: 0.43 MG/DL (ref 0.2–1)
PROT SERPL-MCNC: 7.1 G/DL (ref 6.4–8.4)

## 2022-09-07 PROCEDURE — 80076 HEPATIC FUNCTION PANEL: CPT

## 2022-09-07 PROCEDURE — 36415 COLL VENOUS BLD VENIPUNCTURE: CPT

## 2022-12-27 ENCOUNTER — APPOINTMENT (OUTPATIENT)
Dept: LAB | Facility: CLINIC | Age: 82
End: 2022-12-27

## 2022-12-27 ENCOUNTER — TRANSCRIBE ORDERS (OUTPATIENT)
Dept: LAB | Facility: CLINIC | Age: 82
End: 2022-12-27

## 2022-12-27 DIAGNOSIS — G12.21 AMYOTROPHIC LATERAL SCLEROSIS (HCC): ICD-10-CM

## 2022-12-27 DIAGNOSIS — G12.21 AMYOTROPHIC LATERAL SCLEROSIS (HCC): Primary | ICD-10-CM

## 2022-12-27 LAB
ALBUMIN SERPL BCP-MCNC: 3.5 G/DL (ref 3.5–5)
ALP SERPL-CCNC: 91 U/L (ref 46–116)
ALT SERPL W P-5'-P-CCNC: 32 U/L (ref 12–78)
ANION GAP SERPL CALCULATED.3IONS-SCNC: 6 MMOL/L (ref 4–13)
AST SERPL W P-5'-P-CCNC: 24 U/L (ref 5–45)
BILIRUB SERPL-MCNC: 0.49 MG/DL (ref 0.2–1)
BUN SERPL-MCNC: 15 MG/DL (ref 5–25)
CALCIUM SERPL-MCNC: 9.2 MG/DL (ref 8.3–10.1)
CHLORIDE SERPL-SCNC: 108 MMOL/L (ref 96–108)
CO2 SERPL-SCNC: 29 MMOL/L (ref 21–32)
CREAT SERPL-MCNC: 0.52 MG/DL (ref 0.6–1.3)
GFR SERPL CREATININE-BSD FRML MDRD: 98 ML/MIN/1.73SQ M
GLUCOSE P FAST SERPL-MCNC: 132 MG/DL (ref 65–99)
POTASSIUM SERPL-SCNC: 4.4 MMOL/L (ref 3.5–5.3)
PROT SERPL-MCNC: 6.9 G/DL (ref 6.4–8.4)
SODIUM SERPL-SCNC: 143 MMOL/L (ref 135–147)

## 2023-08-25 ENCOUNTER — APPOINTMENT (OUTPATIENT)
Dept: LAB | Facility: CLINIC | Age: 83
End: 2023-08-25
Payer: MEDICARE

## 2023-08-25 ENCOUNTER — TRANSCRIBE ORDERS (OUTPATIENT)
Dept: LAB | Facility: CLINIC | Age: 83
End: 2023-08-25

## 2023-08-25 DIAGNOSIS — G12.21 AMYOTROPHIC LATERAL SCLEROSIS (HCC): ICD-10-CM

## 2023-08-25 DIAGNOSIS — G12.21 AMYOTROPHIC LATERAL SCLEROSIS (HCC): Primary | ICD-10-CM

## 2023-08-25 DIAGNOSIS — Z51.81 ENCOUNTER FOR THERAPEUTIC DRUG MONITORING: ICD-10-CM

## 2023-08-25 LAB
ALBUMIN SERPL BCP-MCNC: 4.1 G/DL (ref 3.5–5)
ALP SERPL-CCNC: 87 U/L (ref 34–104)
ALT SERPL W P-5'-P-CCNC: 23 U/L (ref 7–52)
ANION GAP SERPL CALCULATED.3IONS-SCNC: 7 MMOL/L
AST SERPL W P-5'-P-CCNC: 25 U/L (ref 13–39)
BILIRUB SERPL-MCNC: 0.48 MG/DL (ref 0.2–1)
BUN SERPL-MCNC: 13 MG/DL (ref 5–25)
CALCIUM SERPL-MCNC: 9.5 MG/DL (ref 8.4–10.2)
CHLORIDE SERPL-SCNC: 103 MMOL/L (ref 96–108)
CO2 SERPL-SCNC: 30 MMOL/L (ref 21–32)
CREAT SERPL-MCNC: 0.44 MG/DL (ref 0.6–1.3)
GFR SERPL CREATININE-BSD FRML MDRD: 104 ML/MIN/1.73SQ M
GLUCOSE P FAST SERPL-MCNC: 102 MG/DL (ref 65–99)
POTASSIUM SERPL-SCNC: 4.4 MMOL/L (ref 3.5–5.3)
PROT SERPL-MCNC: 6.7 G/DL (ref 6.4–8.4)
SODIUM SERPL-SCNC: 140 MMOL/L (ref 135–147)

## 2023-08-25 PROCEDURE — 36415 COLL VENOUS BLD VENIPUNCTURE: CPT

## 2023-08-25 PROCEDURE — 80053 COMPREHEN METABOLIC PANEL: CPT

## 2024-03-05 ENCOUNTER — TRANSCRIBE ORDERS (OUTPATIENT)
Dept: LAB | Facility: CLINIC | Age: 84
End: 2024-03-05

## 2024-03-05 ENCOUNTER — APPOINTMENT (OUTPATIENT)
Dept: LAB | Facility: CLINIC | Age: 84
End: 2024-03-05
Payer: MEDICARE

## 2024-03-05 DIAGNOSIS — G12.21 AMYOTROPHIC LATERAL SCLEROSIS (HCC): ICD-10-CM

## 2024-03-05 DIAGNOSIS — G12.29 PSEUDOBULBAR PALSY (HCC): ICD-10-CM

## 2024-03-05 DIAGNOSIS — G12.21 AMYOTROPHIC LATERAL SCLEROSIS (HCC): Primary | ICD-10-CM

## 2024-03-05 LAB
ALBUMIN SERPL BCP-MCNC: 3.9 G/DL (ref 3.5–5)
ALP SERPL-CCNC: 78 U/L (ref 34–104)
ALT SERPL W P-5'-P-CCNC: 25 U/L (ref 7–52)
ANION GAP SERPL CALCULATED.3IONS-SCNC: 8 MMOL/L
AST SERPL W P-5'-P-CCNC: 25 U/L (ref 13–39)
BILIRUB SERPL-MCNC: 0.55 MG/DL (ref 0.2–1)
BUN SERPL-MCNC: 22 MG/DL (ref 5–25)
CALCIUM SERPL-MCNC: 8.9 MG/DL (ref 8.4–10.2)
CHLORIDE SERPL-SCNC: 105 MMOL/L (ref 96–108)
CO2 SERPL-SCNC: 30 MMOL/L (ref 21–32)
CREAT SERPL-MCNC: 0.41 MG/DL (ref 0.6–1.3)
GFR SERPL CREATININE-BSD FRML MDRD: 107 ML/MIN/1.73SQ M
GLUCOSE P FAST SERPL-MCNC: 119 MG/DL (ref 65–99)
POTASSIUM SERPL-SCNC: 3.9 MMOL/L (ref 3.5–5.3)
PROT SERPL-MCNC: 6.6 G/DL (ref 6.4–8.4)
SODIUM SERPL-SCNC: 143 MMOL/L (ref 135–147)

## 2024-03-05 PROCEDURE — 80053 COMPREHEN METABOLIC PANEL: CPT

## 2024-03-05 PROCEDURE — 36415 COLL VENOUS BLD VENIPUNCTURE: CPT

## 2024-04-22 ENCOUNTER — OFFICE VISIT (OUTPATIENT)
Dept: URGENT CARE | Facility: CLINIC | Age: 84
End: 2024-04-22
Payer: COMMERCIAL

## 2024-04-22 VITALS
TEMPERATURE: 98.1 F | DIASTOLIC BLOOD PRESSURE: 80 MMHG | SYSTOLIC BLOOD PRESSURE: 118 MMHG | HEART RATE: 98 BPM | WEIGHT: 200 LBS | RESPIRATION RATE: 18 BRPM | OXYGEN SATURATION: 98 % | HEIGHT: 69 IN | BODY MASS INDEX: 29.62 KG/M2

## 2024-04-22 DIAGNOSIS — R04.0 EPISTAXIS: Primary | ICD-10-CM

## 2024-04-22 PROCEDURE — 99213 OFFICE O/P EST LOW 20 MIN: CPT | Performed by: PHYSICIAN ASSISTANT

## 2024-04-22 RX ORDER — RILUZOLE 50 MG/1
1 TABLET, FILM COATED ORAL 2 TIMES DAILY
COMMUNITY
Start: 2022-07-01

## 2024-04-22 NOTE — PROGRESS NOTES
"[facility]        NAME: Ghada Garrett is a 83 y.o. male  : 1940    MRN: [id number]  DATE: 2024  TIME: 3:52 PM    /80   Pulse 98   Temp 98.1 °F (36.7 °C) (Tympanic)   Resp 18   Ht 5' 9\" (1.753 m)   Wt 90.7 kg (200 lb)   SpO2 98%   BMI 29.53 kg/m²     Assessment and Plan   Epistaxis [R04.0]  1. Epistaxis              Patient Instructions       Follow up with PCP in 3-5 days.  Proceed to  ER if symptoms worsen.    Chief Complaint     Chief Complaint   Patient presents with    Nose Bleed     Patient states that he has been having nose bleed on and off for the last 2 weeks. Patient isnt on blood thinners.         History of Present Illness       Pt with nose bleed earlier today, pt has been having trouble for over a month with intermittent nose bleeds , slight bleeding started in office     Nose Bleed         Review of Systems   Review of Systems   Constitutional: Negative.    HENT:  Positive for nosebleeds.    Eyes: Negative.    Respiratory: Negative.     Cardiovascular: Negative.    Gastrointestinal: Negative.    Endocrine: Negative.    Genitourinary: Negative.    Musculoskeletal: Negative.    Skin: Negative.    Allergic/Immunologic: Negative.    Neurological: Negative.    Hematological: Negative.    Psychiatric/Behavioral: Negative.     All other systems reviewed and are negative.        Current Medications       Current Outpatient Medications:     acetaminophen (TYLENOL) 500 mg tablet, Take 1,000 mg by mouth every 6 (six) hours as needed, Disp: , Rfl:     cholecalciferol (VITAMIN D3) 400 units tablet, Take 400 Units by mouth daily, Disp: , Rfl:     Cyanocobalamin (VITAMIN B-12) 5000 MCG TBDP, Take by mouth daily, Disp: , Rfl:     riluzole (RILUTEK) 50 MG tablet, Take 1 tablet by mouth 2 (two) times a day, Disp: , Rfl:     Current Allergies     Allergies as of 2024 - Reviewed 2024   Allergen Reaction Noted    Diphenhydramine Other (See Comments) 2022    Iodine - " "food allergy Rash 12/19/2017            The following portions of the patient's history were reviewed and updated as appropriate: allergies, current medications, past family history, past medical history, past social history, past surgical history and problem list.     Past Medical History:   Diagnosis Date    Diphtheria     History of chicken pox     Kidney stones     Lumbar stenosis     Measles        Past Surgical History:   Procedure Laterality Date    KIDNEY STONE SURGERY      WI ARTHRODESIS POSTERIOR/PSTLAT TQ 1NTRSPC LUMBAR Bilateral 12/10/2018    Procedure: L4-5 posterior decompression with instrumented fixation fusion; transforaminal lumbar interbody fusion;  Surgeon: Addi Kinsey MD;  Location: [facility];  Service: Neurosurgery       History reviewed. No pertinent family history.      Medications have been verified.        Objective   /80   Pulse 98   Temp 98.1 °F (36.7 °C) (Tympanic)   Resp 18   Ht 5' 9\" (1.753 m)   Wt 90.7 kg (200 lb)   SpO2 98%   BMI 29.53 kg/m²        Physical Exam     Physical Exam  Vitals and nursing note reviewed.   Constitutional:       Appearance: Normal appearance. He is normal weight.      Comments: Nasal pinchers given for home    HENT:      Head: Normocephalic and atraumatic.      Right Ear: Tympanic membrane, ear canal and external ear normal.      Left Ear: Tympanic membrane, ear canal and external ear normal.      Nose:      Comments: Dried blood left nares no active bleeding   slight rihinorrhea from left nares    Rhinorocket and afrin placed to left nares,  bleeding controlled  nasal pinchers given for home      Mouth/Throat:      Mouth: Mucous membranes are moist.      Pharynx: Oropharynx is clear. No oropharyngeal exudate or posterior oropharyngeal erythema.      Comments: No posterior pharynx blood  Cardiovascular:      Rate and Rhythm: Normal rate and regular rhythm.      Pulses: Normal pulses.      Heart sounds: Normal heart sounds.   Pulmonary:      " Effort: Pulmonary effort is normal.      Breath sounds: Normal breath sounds.   Abdominal:      Palpations: Abdomen is soft.   Musculoskeletal:      Cervical back: Normal range of motion and neck supple.      Comments: Pt in wheelchair    Neurological:      Mental Status: He is alert and oriented to person, place, and time.

## 2024-04-23 PROCEDURE — 99283 EMERGENCY DEPT VISIT LOW MDM: CPT

## 2024-04-24 ENCOUNTER — HOSPITAL ENCOUNTER (EMERGENCY)
Facility: HOSPITAL | Age: 84
Discharge: HOME | End: 2024-04-24
Attending: EMERGENCY MEDICINE
Payer: COMMERCIAL

## 2024-04-24 VITALS
RESPIRATION RATE: 18 BRPM | OXYGEN SATURATION: 92 % | DIASTOLIC BLOOD PRESSURE: 89 MMHG | SYSTOLIC BLOOD PRESSURE: 150 MMHG | HEART RATE: 88 BPM | TEMPERATURE: 97.7 F

## 2024-04-24 DIAGNOSIS — R04.0 LEFT-SIDED EPISTAXIS: Primary | ICD-10-CM

## 2024-04-24 PROCEDURE — 30903 CONTROL OF NOSEBLEED: CPT | Performed by: EMERGENCY MEDICINE

## 2024-04-24 PROCEDURE — 99284 EMERGENCY DEPT VISIT MOD MDM: CPT | Performed by: EMERGENCY MEDICINE

## 2024-04-24 RX ORDER — OXYMETAZOLINE HYDROCHLORIDE 0.05 G/100ML
2 SPRAY NASAL ONCE
Status: COMPLETED | OUTPATIENT
Start: 2024-04-24 | End: 2024-04-24

## 2024-04-24 RX ORDER — TRANEXAMIC ACID 100 MG/ML
500 INJECTION, SOLUTION INTRAVENOUS ONCE
Status: COMPLETED | OUTPATIENT
Start: 2024-04-24 | End: 2024-04-24

## 2024-04-24 RX ADMIN — SILVER NITRATE APPLICATORS 1 APPLICATOR: 25; 75 STICK TOPICAL at 00:26

## 2024-04-24 RX ADMIN — TRANEXAMIC ACID 500 MG: 1 INJECTION, SOLUTION INTRAVENOUS at 00:27

## 2024-04-24 RX ADMIN — OXYMETAZOLINE HYDROCHLORIDE 2 SPRAY: 0.05 SPRAY NASAL at 00:26

## 2024-04-24 NOTE — ED PROVIDER NOTES
History  Chief Complaint   Patient presents with    Nose Bleed     Pt c/o of nose bleed.      83-year-old male with history of ALS and lumbar stenosis presents with recurrent epistaxis.  States he had recurrent short episodes of left-sided epistaxis for 6 weeks.  Seen in urgent care on 4/22/2024 and had Merocel pack placed.  Was seen by ENT earlier today who removed the Merocel and applied silver nitrate cautery to the bleeding vessel.  He did well until nose started bleeding again about an hour ago.        Prior to Admission Medications   Prescriptions Last Dose Informant Patient Reported? Taking?   Cyanocobalamin (VITAMIN B-12) 5000 MCG TBDP   Yes No   Sig: Take by mouth daily   acetaminophen (TYLENOL) 500 mg tablet   Yes No   Sig: Take 1,000 mg by mouth every 6 (six) hours as needed   cholecalciferol (VITAMIN D3) 400 units tablet   Yes No   Sig: Take 400 Units by mouth daily   riluzole (RILUTEK) 50 MG tablet   Yes No   Sig: Take 1 tablet by mouth 2 (two) times a day      Facility-Administered Medications: None       Past Medical History:   Diagnosis Date    ALS (amyotrophic lateral sclerosis) (HCC)     Diphtheria     History of chicken pox     Kidney stones     Lumbar stenosis     Measles        Past Surgical History:   Procedure Laterality Date    KIDNEY STONE SURGERY      CT ARTHRODESIS POSTERIOR/PSTLAT TQ 1NTRSPC LUMBAR Bilateral 12/10/2018    Procedure: L4-5 posterior decompression with instrumented fixation fusion; transforaminal lumbar interbody fusion;  Surgeon: Addi Kinsey MD;  Location: [facility] OR;  Service: Neurosurgery       History reviewed. No pertinent family history.  I have reviewed and agree with the history as documented.    E-Cigarette/Vaping    E-Cigarette Use Never User      E-Cigarette/Vaping Substances    Nicotine No     THC No     CBD No     Flavoring No     Other No     Unknown No      Social History     Tobacco Use    Smoking status: Former    Smokeless tobacco: Never   Vaping Use     Vaping status: Never Used   Substance Use Topics    Alcohol use: Yes     Comment: MOD    Drug use: No       Review of Systems   Constitutional:  Negative for fever.   HENT:  Positive for nosebleeds.    Respiratory:  Negative for cough and shortness of breath.    Cardiovascular:  Negative for chest pain.   Neurological:  Negative for light-headedness.       Physical Exam  Physical Exam  Vitals and nursing note reviewed.   Constitutional:       General: He is not in acute distress.     Appearance: He is well-developed and normal weight. He is not ill-appearing or diaphoretic.   HENT:      Head: Normocephalic and atraumatic.      Right Ear: External ear normal.      Left Ear: External ear normal.      Nose:      Comments: Nose clip had been applied prior to my exam.  This was removed; scant dry blood clots in left nares. No septal deviation nor perforation.  Cauterized area noted.  O active bleeding.  Eyes:      General: No scleral icterus.     Conjunctiva/sclera: Conjunctivae normal.   Neck:      Vascular: No JVD.   Cardiovascular:      Rate and Rhythm: Normal rate and regular rhythm.      Pulses: Normal pulses.   Pulmonary:      Effort: Pulmonary effort is normal. No respiratory distress.   Abdominal:      Palpations: Abdomen is soft.      Tenderness: There is no abdominal tenderness.   Musculoskeletal:         General: No tenderness. Normal range of motion.      Cervical back: Neck supple.   Skin:     General: Skin is warm and dry.      Findings: No rash.   Neurological:      General: No focal deficit present.      Mental Status: He is alert and oriented to person, place, and time. Mental status is at baseline.      Cranial Nerves: No cranial nerve deficit.      Coordination: Coordination normal.      Deep Tendon Reflexes: Reflexes are normal and symmetric.   Psychiatric:         Mood and Affect: Mood normal.         Behavior: Behavior normal.         Vital Signs  ED Triage Vitals   Temperature Pulse Respirations  "Blood Pressure SpO2   04/24/24 0006 04/24/24 0006 04/24/24 0006 04/24/24 0006 04/24/24 0006   97.7 °F (36.5 °C) 89 18 157/88 94 %      Temp Source Heart Rate Source Patient Position - Orthostatic VS BP Location FiO2 (%)   04/24/24 0006 04/24/24 0015 04/24/24 0015 04/24/24 0015 --   Oral Monitor Lying Right arm       Pain Score       --                  Vitals:    04/24/24 0006 04/24/24 0015 04/24/24 0030 04/24/24 0100   BP: 157/88 157/88 136/84 150/89   Pulse: 89 89 89 88   Patient Position - Orthostatic VS:  Lying Lying Lying         Visual Acuity      ED Medications  Medications   oxymetazoline (AFRIN) 0.05 % nasal spray 2 spray (2 sprays Each Nare Given 4/24/24 0026)   tranexamic acid 100mg/mL (for epistaxis) 500 mg (500 mg Nasal Given 4/24/24 0027)   silver nitrate-potassium nitrate (ARZOL SILVER NITRATE) 75-25 % applicator 1 applicator (1 applicator Topical Given 4/24/24 0026)       Diagnostic Studies  Results Reviewed       None                   No orders to display              Procedures  Epistaxis management    Date/Time: 4/24/2024 12:51 AM    Performed by: Biju Roca DO  Authorized by: Biju Roca DO  Universal Protocol:  Consent: Verbal consent obtained.  Risks and benefits: risks, benefits and alternatives were discussed  Consent given by: patient and spouse  Time out: Immediately prior to procedure a \"time out\" was called to verify the correct patient, procedure, equipment, support staff and site/side marked as required.  Patient understanding: patient states understanding of the procedure being performed  Patient consent: the patient's understanding of the procedure matches consent given  Procedure consent: procedure consent matches procedure scheduled  Required items: required blood products, implants, devices, and special equipment available  Patient identity confirmed: verbally with patient    Patient location:  ED  Anesthesia (see MAR for exact dosages):     Anesthesia method:  " None  Procedure details:     Treatment site:  L septum    Hemostasis method:  Merocel sponge (TXA)    Approach:  External    Spec Headlamp used: Yes      Treatment complexity:  Limited    Treatment episode: recurring    Post-procedure details:     Assessment:  Bleeding stopped    Patient tolerance of procedure:  Tolerated well, no immediate complications           ED Course                               SBIRT 20yo+      Flowsheet Row Most Recent Value   Initial Alcohol Screen: US AUDIT-C     1. How often do you have a drink containing alcohol? 0 Filed at: 04/24/2024 0029   2. How many drinks containing alcohol do you have on a typical day you are drinking?  0 Filed at: 04/24/2024 0029   3a. Male UNDER 65: How often do you have five or more drinks on one occasion? 0 Filed at: 04/24/2024 0029   3b. FEMALE Any Age, or MALE 65+: How often do you have 4 or more drinks on one occassion? 0 Filed at: 04/24/2024 0029   Audit-C Score 0 Filed at: 04/24/2024 0029   EDWARD: How many times in the past year have you...    Used an illegal drug or used a prescription medication for non-medical reasons? Never Filed at: 04/24/2024 0029                      Medical Decision Making  Risk  OTC drugs.  Prescription drug management.             Disposition  Final diagnoses:   Left-sided epistaxis     Time reflects when diagnosis was documented in both MDM as applicable and the Disposition within this note       Time User Action Codes Description Comment    4/24/2024 12:50 AM Biju Roca Add [R04.0] Left-sided epistaxis           ED Disposition       ED Disposition   Discharge    Condition   Stable    Date/Time   Wed Apr 24, 2024 0050    Comment   Ghada Garrett discharge to home/self care.                   Follow-up Information       Follow up With Specialties Details Why Contact Info    Salazar Merrill MD Otolaryngology Call today  [address]  [address]  [place] [address]  [phone]              Discharge Medication List as  of 4/24/2024  1:03 AM        CONTINUE these medications which have NOT CHANGED    Details   acetaminophen (TYLENOL) 500 mg tablet Take 1,000 mg by mouth every 6 (six) hours as needed, Starting Tue 12/19/2017, Historical Med      cholecalciferol (VITAMIN D3) 400 units tablet Take 400 Units by mouth daily, Historical Med      Cyanocobalamin (VITAMIN B-12) 5000 MCG TBDP Take by mouth daily, Starting Tue 12/19/2017, Historical Med      riluzole (RILUTEK) 50 MG tablet Take 1 tablet by mouth 2 (two) times a day, Starting Fri 7/1/2022, Historical Med             No discharge procedures on file.    PDMP Review       None            ED Provider  Electronically Signed by             Biju Roca DO  04/25/24 [id number]

## 2024-04-24 NOTE — DISCHARGE INSTRUCTIONS
We packed the nose with a Merocel soaked with TXA.  Keep the packing in until the ear nose and throat doctor advises that you remove it or sees you back in the office.  If packing is in for greater than 2 days he will need to prescribe antibiotic.    If unable to reach the ENT doctors speak to your PCP about this.    Keep well-hydrated.  Avoid bending over and straining.

## (undated) DEVICE — TOOL 14MH30 LEGEND 14CM 3MM: Brand: MIDAS REX ™

## (undated) DEVICE — BOWL ASSY BM210 DUAL BLADE DISPOSABLE: Brand: MIDAS REX™

## (undated) DEVICE — DRAPE C-ARMOUR

## (undated) DEVICE — NEURO PATTIES 1/2 X 1 1/2

## (undated) DEVICE — DRAPE LAPAROTOMY W/POUCHES

## (undated) DEVICE — JACKSON TABLE FOAM POSITIONING KIT: Brand: CARDINAL HEALTH

## (undated) DEVICE — ANTIBACTERIAL VIOLET BRAIDED (POLYGLACTIN 910), SYNTHETIC ABSORBABLE SUTURE: Brand: COATED VICRYL

## (undated) DEVICE — BETHLEHEM UNIVERSAL SPINE, KIT: Brand: CARDINAL HEALTH

## (undated) DEVICE — PLUMEPEN PRO 10FT

## (undated) DEVICE — INTENDED FOR TISSUE SEPARATION, AND OTHER PROCEDURES THAT REQUIRE A SHARP SURGICAL BLADE TO PUNCTURE OR CUT.: Brand: BARD-PARKER SAFETY BLADES SIZE 10, STERILE

## (undated) DEVICE — GLOVE INDICATOR PI UNDERGLOVE SZ 7.5 BLUE

## (undated) DEVICE — FLOSEAL HEMOSTATIC MATRIX, 10 ML: Brand: FLOSEAL

## (undated) DEVICE — GLOVE SRG BIOGEL 7.5

## (undated) DEVICE — JACKSON-PRATT 100CC BULB RESERVOIR: Brand: CARDINAL HEALTH

## (undated) DEVICE — TOOL 15BA50 LEGEND 15CM 5MM BA: Brand: MIDAS REX™

## (undated) DEVICE — SPONGE PVP SCRUB WING STERILE

## (undated) DEVICE — BLADE ELECTRODE: Brand: EDGE

## (undated) DEVICE — TRAY FOLEY 16FR URIMETER SURESTEP

## (undated) DEVICE — JP PERF DRN SIL FLT 7MM FULL: Brand: CARDINAL HEALTH

## (undated) DEVICE — BIPOLAR SEALER 23-113-1 AQM 2.3: Brand: AQUAMANTYS™

## (undated) DEVICE — DRESSING MEPILEX BORDER 4 X 8 IN

## (undated) DEVICE — PROXIMATE PLUS MD MULTI-DIRECTIONAL RELEASE SKIN STAPLERS CONTAINS 35 STAINLESS STEEL STAPLES APPROXIMATE CLOSED DIMENSIONS: 6.9MM X 3.9MM WIDE: Brand: PROXIMATE

## (undated) DEVICE — INTENDED FOR TISSUE SEPARATION, AND OTHER PROCEDURES THAT REQUIRE A SHARP SURGICAL BLADE TO PUNCTURE OR CUT.: Brand: BARD-PARKER ® CARBON RIB-BACK BLADES

## (undated) DEVICE — LIGHT HANDLE COVER SLEEVE DISP BLUE STELLAR

## (undated) DEVICE — SPECIMEN CONTAINER STERILE PEEL PACK

## (undated) DEVICE — SNAP KOVER: Brand: UNBRANDED